# Patient Record
Sex: FEMALE | Race: WHITE | NOT HISPANIC OR LATINO | Employment: FULL TIME | URBAN - METROPOLITAN AREA
[De-identification: names, ages, dates, MRNs, and addresses within clinical notes are randomized per-mention and may not be internally consistent; named-entity substitution may affect disease eponyms.]

---

## 2018-01-05 ENCOUNTER — OFFICE VISIT (OUTPATIENT)
Dept: FAMILY MEDICINE CLINIC | Facility: CLINIC | Age: 23
End: 2018-01-05

## 2018-01-05 VITALS
HEIGHT: 63 IN | TEMPERATURE: 99.9 F | RESPIRATION RATE: 24 BRPM | BODY MASS INDEX: 38.13 KG/M2 | SYSTOLIC BLOOD PRESSURE: 102 MMHG | OXYGEN SATURATION: 98 % | DIASTOLIC BLOOD PRESSURE: 70 MMHG | WEIGHT: 215.2 LBS | HEART RATE: 136 BPM

## 2018-01-05 DIAGNOSIS — R35.0 URINARY FREQUENCY: ICD-10-CM

## 2018-01-05 DIAGNOSIS — R05.9 COUGH: ICD-10-CM

## 2018-01-05 DIAGNOSIS — J10.1 INFLUENZA A: Primary | ICD-10-CM

## 2018-01-05 DIAGNOSIS — J45.41 MODERATE PERSISTENT ASTHMA WITH ACUTE EXACERBATION: ICD-10-CM

## 2018-01-05 LAB
BILIRUB BLD-MCNC: NEGATIVE MG/DL
CLARITY, POC: ABNORMAL
COLOR UR: ABNORMAL
EXPIRATION DATE: ABNORMAL
FLUAV AG NPH QL: POSITIVE
FLUBV AG NPH QL: NEGATIVE
GLUCOSE UR STRIP-MCNC: NEGATIVE MG/DL
INTERNAL CONTROL: ABNORMAL
KETONES UR QL: NEGATIVE
LEUKOCYTE EST, POC: NEGATIVE
Lab: ABNORMAL
NITRITE UR-MCNC: NEGATIVE MG/ML
PH UR: 8 [PH] (ref 5–8)
PROT UR STRIP-MCNC: ABNORMAL MG/DL
RBC # UR STRIP: ABNORMAL /UL
SP GR UR: 1.01 (ref 1–1.03)
UROBILINOGEN UR QL: NORMAL

## 2018-01-05 PROCEDURE — 81003 URINALYSIS AUTO W/O SCOPE: CPT | Performed by: NURSE PRACTITIONER

## 2018-01-05 PROCEDURE — 87804 INFLUENZA ASSAY W/OPTIC: CPT | Performed by: NURSE PRACTITIONER

## 2018-01-05 PROCEDURE — 99204 OFFICE O/P NEW MOD 45 MIN: CPT | Performed by: NURSE PRACTITIONER

## 2018-01-05 RX ORDER — ALBUTEROL SULFATE 90 UG/1
2 AEROSOL, METERED RESPIRATORY (INHALATION) EVERY 4 HOURS PRN
Qty: 1 INHALER | Refills: 5 | Status: SHIPPED | OUTPATIENT
Start: 2018-01-05 | End: 2018-11-29 | Stop reason: SDUPTHER

## 2018-01-05 RX ORDER — FLUTICASONE PROPIONATE 50 MCG
2 SPRAY, SUSPENSION (ML) NASAL DAILY
Qty: 3 BOTTLE | Refills: 1 | Status: SHIPPED | OUTPATIENT
Start: 2018-01-05 | End: 2018-02-04

## 2018-01-05 RX ORDER — OSELTAMIVIR PHOSPHATE 75 MG/1
75 CAPSULE ORAL 2 TIMES DAILY
Qty: 10 CAPSULE | Refills: 0 | Status: SHIPPED | OUTPATIENT
Start: 2018-01-05 | End: 2018-01-16

## 2018-01-05 RX ORDER — MONTELUKAST SODIUM 10 MG/1
10 TABLET ORAL NIGHTLY
Qty: 90 TABLET | Refills: 1 | Status: SHIPPED | OUTPATIENT
Start: 2018-01-05 | End: 2018-01-16 | Stop reason: SINTOL

## 2018-01-05 RX ORDER — METHYLPREDNISOLONE 4 MG/1
TABLET ORAL
Qty: 21 EACH | Refills: 0 | Status: SHIPPED | OUTPATIENT
Start: 2018-01-05 | End: 2018-01-16

## 2018-01-05 RX ORDER — ALBUTEROL SULFATE 90 UG/1
2 AEROSOL, METERED RESPIRATORY (INHALATION) EVERY 4 HOURS PRN
COMMUNITY
End: 2018-01-05 | Stop reason: SDUPTHER

## 2018-01-05 RX ORDER — AZITHROMYCIN 250 MG/1
TABLET, FILM COATED ORAL
Qty: 6 TABLET | Refills: 0 | Status: SHIPPED | OUTPATIENT
Start: 2018-01-05 | End: 2018-01-16

## 2018-01-05 NOTE — PROGRESS NOTES
Subjective   Ely Wills is a 22 y.o. female.     Cough   This is a new problem. The current episode started in the past 7 days. The problem has been gradually worsening. The problem occurs constantly. The cough is productive of purulent sputum. Associated symptoms include chills, ear congestion, a fever, headaches, myalgias, nasal congestion, postnasal drip, rhinorrhea, a sore throat, shortness of breath, sweats and wheezing. Pertinent negatives include no chest pain, eye redness, hemoptysis or rash. The symptoms are aggravated by cold air and lying down. She has tried steroid inhaler, a beta-agonist inhaler and OTC cough suppressant for the symptoms. The treatment provided no relief. Her past medical history is significant for asthma and bronchitis.   URI    This is a new problem. The current episode started in the past 7 days. The maximum temperature recorded prior to her arrival was 100.4 - 100.9 F. The fever has been present for less than 1 day. Associated symptoms include congestion, coughing, headaches, rhinorrhea, sinus pain, sneezing, a sore throat and wheezing. Pertinent negatives include no chest pain, diarrhea, dysuria, joint pain, nausea, rash, swollen glands or vomiting. She has tried decongestant, antihistamine, inhaler use, NSAIDs and sleep for the symptoms. The treatment provided no relief.   Urinary Frequency    This is a new problem. The current episode started yesterday. The problem occurs intermittently. The problem has been unchanged. The patient is experiencing no pain. She is sexually active. There is no history of pyelonephritis. Associated symptoms include chills, frequency, sweats and urgency. Pertinent negatives include no discharge, flank pain, hematuria, nausea or vomiting. She has tried nothing for the symptoms.      NP to establish care    Flu like symptoms for the past 2 days  Cough that is productive, wheezing, HA, fever and chills, body aches, fatigue, chest tightness  + hx of  asthma not currently controlled on asthma treatment for the past several months has been coughing a lot  Needs refill on Advair  Not currently on Singulair or antihistamine daily    Urinary urgency no dysuria or back pain or abdominal pain or blood in urine    Migraine HAs  Takes OTC meds to help    The following portions of the patient's history were reviewed and updated as appropriate: allergies, current medications, past family history, past medical history, past social history, past surgical history and problem list.    Review of Systems   Constitutional: Positive for activity change, appetite change, chills and fever.   HENT: Positive for congestion, postnasal drip, rhinorrhea, sinus pain, sinus pressure, sneezing and sore throat.    Eyes: Positive for discharge (watery eyes). Negative for redness.   Respiratory: Positive for cough, chest tightness, shortness of breath and wheezing. Negative for hemoptysis.    Cardiovascular: Negative for chest pain.   Gastrointestinal: Negative for diarrhea, nausea and vomiting.   Endocrine: Negative.    Genitourinary: Positive for frequency and urgency. Negative for difficulty urinating, dysuria, flank pain and hematuria.   Musculoskeletal: Positive for myalgias. Negative for joint pain.   Skin: Negative.  Negative for rash.   Allergic/Immunologic: Negative.    Neurological: Positive for headaches.   Hematological: Negative.    Psychiatric/Behavioral: Negative.  Negative for sleep disturbance.       Objective   Physical Exam   Constitutional: She is oriented to person, place, and time. She appears well-developed and well-nourished. She is active. She has a sickly appearance. No distress.   HENT:   Head: Normocephalic.   Right Ear: Tympanic membrane, external ear and ear canal normal.   Left Ear: Tympanic membrane, external ear and ear canal normal.   Nose: Mucosal edema and rhinorrhea present. Right sinus exhibits maxillary sinus tenderness and frontal sinus tenderness. Left  sinus exhibits maxillary sinus tenderness and frontal sinus tenderness.   Mouth/Throat: Uvula is midline, oropharynx is clear and moist and mucous membranes are normal. No oropharyngeal exudate, posterior oropharyngeal edema or posterior oropharyngeal erythema.   Eyes: Conjunctivae are normal.   Neck: Normal range of motion. Neck supple.   Cardiovascular: Normal rate, regular rhythm and normal heart sounds.    Pulmonary/Chest: Effort normal and breath sounds normal. She has no wheezes.   Lymphadenopathy:     She has no cervical adenopathy.   Neurological: She is alert and oriented to person, place, and time.   Skin: Skin is warm and dry. No rash noted.   Psychiatric: She has a normal mood and affect. Her behavior is normal. Judgment and thought content normal.   Nursing note and vitals reviewed.      Assessment/Plan   Ely was seen today for establish care, cough, uri, headache and urinary frequency.    Diagnoses and all orders for this visit:    Influenza A    Moderate persistent asthma with acute exacerbation    Urinary frequency  -     POCT urinalysis dipstick, automated    Cough  -     POCT Influenza A/B    Other orders  -     oseltamivir (TAMIFLU) 75 MG capsule; Take 1 capsule by mouth 2 (Two) Times a Day.  -     fluticasone-salmeterol (ADVAIR DISKUS) 250-50 MCG/DOSE DISKUS; Inhale 1 puff 2 (Two) Times a Day.  -     albuterol (PROVENTIL HFA;VENTOLIN HFA) 108 (90 Base) MCG/ACT inhaler; Inhale 2 puffs Every 4 (Four) Hours As Needed for Wheezing.  -     fluticasone (FLONASE) 50 MCG/ACT nasal spray; 2 sprays into each nostril Daily for 30 days. Administer 2 sprays in each nostril for each dose.  -     montelukast (SINGULAIR) 10 MG tablet; Take 1 tablet by mouth Every Night.  -     MethylPREDNISolone (MEDROL, CONNOR,) 4 MG tablet; Take as directed on package instructions.  -     azithromycin (ZITHROMAX) 250 MG tablet; Take 2 tablets the first day, then 1 tablet daily for 4 days.    Urine negative for bacteria  Pt to  drink plenty of fluids    Influenza A positive, B negative pt informed  Take meds as directed  Since pt reports asthma as not currently controlled will start pt on Flonase NS, OTC Claritin or Allegra and Singulair at bedtime. Will refill Advair to use twice day  Will have pt take Tamiflu as directed; will have pt also take Zpak and steroids  Continue OTC meds for symptom relief. Rest and fluids. Avoid contact with others for approx 5 days  F/U if not improving or go to ER if difficulty breathing

## 2018-01-12 ENCOUNTER — TELEPHONE (OUTPATIENT)
Dept: FAMILY MEDICINE CLINIC | Facility: CLINIC | Age: 23
End: 2018-01-12

## 2018-01-16 ENCOUNTER — OFFICE VISIT (OUTPATIENT)
Dept: FAMILY MEDICINE CLINIC | Facility: CLINIC | Age: 23
End: 2018-01-16

## 2018-01-16 VITALS
SYSTOLIC BLOOD PRESSURE: 128 MMHG | DIASTOLIC BLOOD PRESSURE: 90 MMHG | TEMPERATURE: 97.4 F | WEIGHT: 215.8 LBS | HEIGHT: 63 IN | HEART RATE: 88 BPM | RESPIRATION RATE: 20 BRPM | BODY MASS INDEX: 38.24 KG/M2 | OXYGEN SATURATION: 98 %

## 2018-01-16 DIAGNOSIS — J45.40 MODERATE PERSISTENT ASTHMA WITHOUT COMPLICATION: Primary | ICD-10-CM

## 2018-01-16 DIAGNOSIS — J06.9 PROTRACTED URI: ICD-10-CM

## 2018-01-16 PROCEDURE — 99214 OFFICE O/P EST MOD 30 MIN: CPT | Performed by: NURSE PRACTITIONER

## 2018-01-16 RX ORDER — PREDNISONE 10 MG/1
10 TABLET ORAL 2 TIMES DAILY
Qty: 10 TABLET | Refills: 0 | Status: SHIPPED | OUTPATIENT
Start: 2018-01-16 | End: 2018-11-29

## 2018-01-16 NOTE — PROGRESS NOTES
Subjective   Ely Wills is a 22 y.o. female.     History of Present Illness   URI persistent cough  Cough and rattling in her chest at times, wheezing  Feeling much better after treatment for Flu A. Taook Tamiflu Zpak and Medrol dose pack  Using Advair and ProAir inhaler  Taking Mucinex and Daquil OTC  No fever or chills, no SOA or JARAMILLO  No hx of pneumonia   + hx of asthma worsening     The following portions of the patient's history were reviewed and updated as appropriate: allergies, current medications, past family history, past medical history, past social history, past surgical history and problem list.    Review of Systems   Constitutional: Negative for chills and fever.   HENT: Negative.  Negative for congestion, ear pain, rhinorrhea, sinus pain and sore throat.    Eyes: Negative.    Respiratory: Positive for cough and wheezing.    Cardiovascular: Negative.    Gastrointestinal: Negative.    Endocrine: Negative.    Musculoskeletal: Negative.    Skin: Negative.    Allergic/Immunologic: Negative.    Neurological: Negative.  Negative for dizziness and headaches.   Hematological: Negative.    Psychiatric/Behavioral: Negative for sleep disturbance.       Objective   Physical Exam   Constitutional: She is oriented to person, place, and time. She appears well-developed and well-nourished.   HENT:   Head: Normocephalic.   Right Ear: External ear normal.   Left Ear: External ear normal.   Nose: Nose normal.   Mouth/Throat: Oropharynx is clear and moist.   Neck: Neck supple.   Cardiovascular: Normal rate, regular rhythm and normal heart sounds.    Pulmonary/Chest: Effort normal and breath sounds normal. No respiratory distress. She has no wheezes. She has no rales.   Abdominal: Soft.   Neurological: She is alert and oriented to person, place, and time.   Skin: Skin is warm and dry.   Psychiatric: She has a normal mood and affect. Her behavior is normal.   Nursing note and vitals reviewed.      Assessment/Plan    Ely was seen today for cough and asthma.    Diagnoses and all orders for this visit:    Moderate persistent asthma without complication    Protracted URI    Other orders  -     predniSONE (DELTASONE) 10 MG tablet; Take 1 tablet by mouth 2 (Two) Times a Day.      Continue inhalers and Mucinex with plenty of fluids  Will treat with a few more days of oral prednisone. F/U if not improving. Pt agrees.

## 2018-01-31 ENCOUNTER — TELEPHONE (OUTPATIENT)
Dept: FAMILY MEDICINE CLINIC | Facility: CLINIC | Age: 23
End: 2018-01-31

## 2018-01-31 NOTE — TELEPHONE ENCOUNTER
----- Message from Robert Peterson sent at 1/31/2018  2:29 PM EST -----  Contact: ROSALES / PT CALL  PT CALLED AND WAS GIVEN A SAMPLE OF BREO INHALER 200MG.  PATIENT WOULD NOW LIKE TO HAVE AN RX FOR IT. PLEASE CALL PT IF CALLED IN     [facility]      PT CALL BACK [phone]

## 2018-06-18 ENCOUNTER — TELEPHONE (OUTPATIENT)
Dept: FAMILY MEDICINE CLINIC | Facility: CLINIC | Age: 23
End: 2018-06-18

## 2018-06-18 NOTE — TELEPHONE ENCOUNTER
----- Message from Amy Lara sent at 6/18/2018  9:44 AM EDT -----  Contact: BILL; MED REFILLS   PT CALLED IN REQUESTING REFILLS ON THE BREO. SHE WAS ABLE TO  THE MEDICATIONS.  USES IT VERY FREQUENTLY.  SHE WOULD JUST LIKE TO BE ABLE TO HAVE REFILLS AT THE PHARMACY WAITING FOR HER IF POSSIBLE. IF AN APPOINTMENT IS NEEDED PLEASE LET HER KNOW.   SHE HAS ENOUGH TO LAST THIS MONTH SHE JUST WANTING REFILLS.   CALL BACK   [phone]    PT AWARE BILL IS NOT IF THE OFFICE THIS WEEK.

## 2018-07-05 ENCOUNTER — TELEPHONE (OUTPATIENT)
Dept: FAMILY MEDICINE CLINIC | Facility: CLINIC | Age: 23
End: 2018-07-05

## 2018-07-05 NOTE — TELEPHONE ENCOUNTER
----- Message from Daisha Hernandez sent at 7/5/2018  8:55 AM EDT -----  Contact: BILL  PATIENT CALLED ABOUT HER BREO REFILLS, SHE WANTED TO KNOW IF CAN PLEASE CALL THE PHARMACY AND PUT MORE REFILLS ON THAT RX.   THIS IS SOMETHING SHE USES ALL THE TIME THAT REALLY HELPS HER ASTHMA     [phone]  [facility]

## 2018-07-05 NOTE — TELEPHONE ENCOUNTER
I sent it in with 1 refill already. Did she not get it? She will have to be seen every 6 months or sooner if her asthma is not controlled that is why I did not give more than 1 refill. [email or web address]

## 2018-11-29 ENCOUNTER — OFFICE VISIT (OUTPATIENT)
Dept: FAMILY MEDICINE CLINIC | Facility: CLINIC | Age: 23
End: 2018-11-29

## 2018-11-29 VITALS
DIASTOLIC BLOOD PRESSURE: 90 MMHG | RESPIRATION RATE: 16 BRPM | HEIGHT: 63 IN | TEMPERATURE: 98.7 F | WEIGHT: 218.4 LBS | SYSTOLIC BLOOD PRESSURE: 120 MMHG | BODY MASS INDEX: 38.7 KG/M2 | HEART RATE: 92 BPM

## 2018-11-29 DIAGNOSIS — F32.A ANXIETY AND DEPRESSION: ICD-10-CM

## 2018-11-29 DIAGNOSIS — F41.9 ANXIETY AND DEPRESSION: ICD-10-CM

## 2018-11-29 DIAGNOSIS — J45.41 MODERATE PERSISTENT ASTHMA WITH EXACERBATION: Primary | ICD-10-CM

## 2018-11-29 PROCEDURE — 99214 OFFICE O/P EST MOD 30 MIN: CPT | Performed by: NURSE PRACTITIONER

## 2018-11-29 RX ORDER — ALBUTEROL SULFATE 90 UG/1
2 AEROSOL, METERED RESPIRATORY (INHALATION) EVERY 4 HOURS PRN
Qty: 1 INHALER | Refills: 5 | Status: SHIPPED | OUTPATIENT
Start: 2018-11-29 | End: 2019-03-26 | Stop reason: SDUPTHER

## 2018-11-29 RX ORDER — PREDNISONE 10 MG/1
TABLET ORAL
Qty: 20 TABLET | Refills: 0 | Status: SHIPPED | OUTPATIENT
Start: 2018-11-29 | End: 2019-03-26

## 2018-11-29 NOTE — PROGRESS NOTES
Subjective   Ely Wills is a 22 y.o. female.     History of Present Illness   Asthma uncontrolled, having wheezing and tightness in chest and chronic cough  Using Advair and Flonase, using rescue inhaler daily for the past month and before that every 3 days  No longer on Breo (ran out) which helped a lot, Advair only once a day   No longer on allergy medication like Claritin  No fever or chills, no runny nose or nasal congestion    Having issues with anxiety and depression (usually situational if things get really stressful)  Was treated in  and college and has seen a counselor in the past, not currently  Did not like how medication made her feel (very flat) does not want to take medication at this time  Denies SI/Hi no hx of self harm or abuse    The following portions of the patient's history were reviewed and updated as appropriate: allergies, current medications, past family history, past medical history, past social history, past surgical history and problem list.    Review of Systems   Constitutional: Negative for activity change, chills, fatigue, fever, unexpected weight gain and unexpected weight loss.   HENT: Negative for congestion, ear pain, postnasal drip, rhinorrhea, sinus pressure, sneezing, sore throat and tinnitus.    Eyes: Negative.    Respiratory: Positive for cough, chest tightness, shortness of breath and wheezing.    Cardiovascular: Negative for chest pain, palpitations and leg swelling.   Gastrointestinal: Negative.    Endocrine: Negative.  Negative for cold intolerance, heat intolerance, polydipsia, polyphagia and polyuria.   Genitourinary: Negative.    Musculoskeletal: Negative for myalgias, neck pain and neck stiffness.   Allergic/Immunologic: Positive for environmental allergies. Negative for food allergies.   Neurological: Negative for dizziness, light-headedness, headache and confusion.   Psychiatric/Behavioral: Positive for depressed mood and stress. Negative for self-injury, sleep  disturbance and suicidal ideas. The patient is nervous/anxious.        Objective   Physical Exam   Constitutional: She is oriented to person, place, and time. She appears well-developed and well-nourished. No distress.   HENT:   Head: Normocephalic.   Right Ear: External ear normal.   Left Ear: External ear normal.   Nose: Nose normal.   Mouth/Throat: Oropharynx is clear and moist.   Neck: Normal range of motion. Neck supple. No JVD present. No thyromegaly present.   Cardiovascular: Normal rate, regular rhythm and normal heart sounds. Exam reveals no gallop and no friction rub.   No murmur heard.  Pulmonary/Chest: Effort normal and breath sounds normal. No stridor. No respiratory distress. She has no wheezes. She has no rales.   Abdominal: Soft. Bowel sounds are normal.   Musculoskeletal: Normal range of motion. She exhibits no edema.   Lymphadenopathy:     She has no cervical adenopathy.   Neurological: She is alert and oriented to person, place, and time. She has normal reflexes.   Skin: Skin is warm and dry. She is not diaphoretic.   Psychiatric: Her speech is normal and behavior is normal. Judgment and thought content normal. Her mood appears anxious. Cognition and memory are normal. She exhibits a depressed mood (tearful).   Nursing note and vitals reviewed.        Assessment/Plan   Ely was seen today for asthma, anxiety and depression.    Diagnoses and all orders for this visit:    Moderate persistent asthma with exacerbation  -     Fluticasone Furoate-Vilanterol (BREO ELLIPTA) 100-25 MCG/INH aerosol powder ; Inhale 1 puff Daily.  -     albuterol (PROVENTIL HFA;VENTOLIN HFA;PROAIR HFA) 108 (90 Base) MCG/ACT inhaler; Inhale 2 puffs Every 4 (Four) Hours As Needed for Wheezing.  -     predniSONE (DELTASONE) 10 MG tablet; Take 4 tabs X2 days, 3 tabs X2 days, 2 tabs X 2 days, 1 tab X 2 days    Anxiety and depression  -     Pharmacogenetic Testing (PGT) - Saliva, Oral Cavity      Will check pharmacogenetic  testing for proper medication since pt does not want to start anything, she was given a list of Mental Health Providers in the area and will make an appt  Will start pt on Prednisone and refill Albuterol and Flonase and Breo inhalers.   F/U if breathing no improving or cough persists will need to do PFTs and increase asthma treatment. Pt agrees.

## 2018-12-03 ENCOUNTER — TELEPHONE (OUTPATIENT)
Dept: FAMILY MEDICINE CLINIC | Facility: CLINIC | Age: 23
End: 2018-12-03

## 2018-12-03 NOTE — TELEPHONE ENCOUNTER
----- Message from Amy Lara sent at 12/3/2018 12:45 PM EST -----  Contact: CONNIE; ORDER FORM NEEDS UPDATING   [facility] CALLED AND WANTED TO LET YOU KNOW THAT THE TEST PROFILE OPTION IS MISSING. IF YOU CAN SELECT THAT AND RESEND IT THEY WILL BE ABLE TO COMPLETE THE TEST YOU ARE REQUESTING.     CALL BACK   [phone]

## 2018-12-05 ENCOUNTER — TELEPHONE (OUTPATIENT)
Dept: FAMILY MEDICINE CLINIC | Facility: CLINIC | Age: 23
End: 2018-12-05

## 2018-12-05 NOTE — TELEPHONE ENCOUNTER
----- Message from Amy Lara sent at 12/5/2018  9:27 AM EST -----  Contact: qi; order needed filled out   Tia vogel is still waiting for the req form to be filled out.         Call back   [phone]

## 2018-12-07 ENCOUNTER — TELEPHONE (OUTPATIENT)
Dept: FAMILY MEDICINE CLINIC | Facility: CLINIC | Age: 23
End: 2018-12-07

## 2018-12-07 DIAGNOSIS — J45.909 MODERATE ASTHMA WITHOUT COMPLICATION, UNSPECIFIED WHETHER PERSISTENT: Primary | ICD-10-CM

## 2018-12-07 NOTE — TELEPHONE ENCOUNTER
----- Message from Amy Lara sent at 12/7/2018  2:23 PM EST -----  Contact: qi nevarez refill   Pt called in wanting to know if breo could be changed to advair. She stated that breo was over 200every month.       Call back   [phone]

## 2018-12-14 ENCOUNTER — TELEPHONE (OUTPATIENT)
Dept: FAMILY MEDICINE CLINIC | Facility: CLINIC | Age: 23
End: 2018-12-14

## 2018-12-14 NOTE — TELEPHONE ENCOUNTER
Her biggest concern is painful cough, wheezing & SOA. She has also had PND, sinus pressure, congestion & HA. Denies fever. She finished the Zpak she was given and is still using the Breo inhaler.

## 2018-12-14 NOTE — TELEPHONE ENCOUNTER
Sounds like pt needs to be seen again. Would recommend that she go to [facility] or ER for evaluation since it was 2 weeks ago since I evaluated her she may need a CXR or breathing treatment or IV steroids.[facility]

## 2018-12-14 NOTE — TELEPHONE ENCOUNTER
----- Message from Robert Raymond sent at 12/14/2018 12:47 PM EST -----  Contact: pt; guru  Patient thinks she has a cold and is wanting to know can she get something called in for that.    [place], [place]    Phone: [phone]

## 2018-12-14 NOTE — TELEPHONE ENCOUNTER
Please ask Ely: Does she want something for cough or congestion? Or what are her symptoms? How many days has she been feeling ill? What has she already tried to feel better? Fever or chills? ajc

## 2018-12-17 ENCOUNTER — TELEPHONE (OUTPATIENT)
Dept: FAMILY MEDICINE CLINIC | Facility: CLINIC | Age: 23
End: 2018-12-17

## 2018-12-17 NOTE — TELEPHONE ENCOUNTER
----- Message from Amy Lara sent at 12/17/2018  3:41 PM EST -----  Contact: BILL' MED QUESTION  Pt called in stating that breo and adviar are both over 200. She stated that the pharmacy would be sending request over for medication. The pt wanted to know if she is able to use her rescue inhaler every day.       Call abck     [phone]

## 2018-12-18 RX ORDER — BUDESONIDE AND FORMOTEROL FUMARATE DIHYDRATE 160; 4.5 UG/1; UG/1
2 AEROSOL RESPIRATORY (INHALATION)
Qty: 1 INHALER | Refills: 5 | Status: SHIPPED | OUTPATIENT
Start: 2018-12-18 | End: 2019-04-25 | Stop reason: SDUPTHER

## 2018-12-18 NOTE — TELEPHONE ENCOUNTER
Yes you can use your rescue inhaler but it sounds like you need an inhaled steroid along with current treatment or long acting beta agonist which is what Advair or Breo. There should be one that your insurance covers it may be Symbicort. Will send that into the pharmacy to see if this is less expensive.

## 2019-03-26 ENCOUNTER — OFFICE VISIT (OUTPATIENT)
Dept: FAMILY MEDICINE CLINIC | Facility: CLINIC | Age: 24
End: 2019-03-26

## 2019-03-26 VITALS
TEMPERATURE: 99 F | DIASTOLIC BLOOD PRESSURE: 84 MMHG | SYSTOLIC BLOOD PRESSURE: 126 MMHG | BODY MASS INDEX: 39.09 KG/M2 | WEIGHT: 220.6 LBS

## 2019-03-26 DIAGNOSIS — J45.41 MODERATE PERSISTENT ASTHMA WITH EXACERBATION: ICD-10-CM

## 2019-03-26 DIAGNOSIS — M54.2 NECK PAIN: ICD-10-CM

## 2019-03-26 DIAGNOSIS — H81.10 BENIGN PAROXYSMAL VERTIGO, UNSPECIFIED LATERALITY: Primary | ICD-10-CM

## 2019-03-26 PROCEDURE — 99214 OFFICE O/P EST MOD 30 MIN: CPT | Performed by: NURSE PRACTITIONER

## 2019-03-26 RX ORDER — MECLIZINE HYDROCHLORIDE 25 MG/1
25 TABLET ORAL 3 TIMES DAILY PRN
Qty: 30 TABLET | Refills: 0 | Status: SHIPPED | OUTPATIENT
Start: 2019-03-26 | End: 2020-04-21

## 2019-03-26 RX ORDER — ALBUTEROL SULFATE 90 UG/1
2 AEROSOL, METERED RESPIRATORY (INHALATION) EVERY 4 HOURS PRN
Qty: 1 INHALER | Refills: 5 | Status: SHIPPED | OUTPATIENT
Start: 2019-03-26 | End: 2019-12-17 | Stop reason: SDUPTHER

## 2019-03-26 NOTE — PATIENT INSTRUCTIONS
Benign Positional Vertigo  Vertigo is the feeling that you or your surroundings are moving when they are not. Benign positional vertigo is the most common form of vertigo. The cause of this condition is not serious (is benign). This condition is triggered by certain movements and positions (is positional). This condition can be dangerous if it occurs while you are doing something that could endanger you or others, such as driving.  What are the causes?  In many cases, the cause of this condition is not known. It may be caused by a disturbance in an area of the inner ear that helps your brain to sense movement and balance. This disturbance can be caused by a viral infection (labyrinthitis), head injury, or repetitive motion.  What increases the risk?  This condition is more likely to develop in:  · Women.  · People who are 50 years of age or older.    What are the signs or symptoms?  Symptoms of this condition usually happen when you move your head or your eyes in different directions. Symptoms may start suddenly, and they usually last for less than a minute. Symptoms may include:  · Loss of balance and falling.  · Feeling like you are spinning or moving.  · Feeling like your surroundings are spinning or moving.  · Nausea and vomiting.  · Blurred vision.  · Dizziness.  · Involuntary eye movement (nystagmus).    Symptoms can be mild and cause only slight annoyance, or they can be severe and interfere with daily life. Episodes of benign positional vertigo may return (recur) over time, and they may be triggered by certain movements. Symptoms may improve over time.  How is this diagnosed?  This condition is usually diagnosed by medical history and a physical exam of the head, neck, and ears. You may be referred to a health care provider who specializes in ear, nose, and throat (ENT) problems (otolaryngologist) or a provider who specializes in disorders of the nervous system (neurologist). You may have additional testing,  including:  · MRI.  · A CT scan.  · Eye movement tests. Your health care provider may ask you to change positions quickly while he or she watches you for symptoms of benign positional vertigo, such as nystagmus. Eye movement may be tested with an electronystagmogram (ENG), caloric stimulation, the [place]-Hallpike test, or the roll test.  · An electroencephalogram (EEG). This records electrical activity in your brain.  · Hearing tests.    How is this treated?  Usually, your health care provider will treat this by moving your head in specific positions to adjust your inner ear back to normal. Surgery may be needed in severe cases, but this is rare. In some cases, benign positional vertigo may resolve on its own in 2-4 weeks.  Follow these instructions at home:  Safety  · Move slowly.Avoid sudden body or head movements.  · Avoid driving.  · Avoid operating heavy machinery.  · Avoid doing any tasks that would be dangerous to you or others if a vertigo episode would occur.  · If you have trouble walking or keeping your balance, try using a cane for stability. If you feel dizzy or unstable, sit down right away.  · Return to your normal activities as told by your health care provider. Ask your health care provider what activities are safe for you.  General instructions  · Take over-the-counter and prescription medicines only as told by your health care provider.  · Avoid certain positions or movements as told by your health care provider.  · Drink enough fluid to keep your urine clear or pale yellow.  · Keep all follow-up visits as told by your health care provider. This is important.  Contact a health care provider if:  · You have a fever.  · Your condition gets worse or you develop new symptoms.  · Your family or friends notice any behavioral changes.  · Your nausea or vomiting gets worse.  · You have numbness or a “pins and needles” sensation.  Get help right away if:  · You have difficulty speaking or moving.  · You are  always dizzy.  · You faint.  · You develop severe headaches.  · You have weakness in your legs or arms.  · You have changes in your hearing or vision.  · You develop a stiff neck.  · You develop sensitivity to light.  This information is not intended to replace advice given to you by your health care provider. Make sure you discuss any questions you have with your health care provider.  Document Released: 09/25/2007 Document Revised: 05/25/2017 Document Reviewed: 04/11/2016  [employer] Interactive Patient Education © 2019 [employer]

## 2019-03-26 NOTE — PROGRESS NOTES
Subjective   Ely Acevedo is a 23 y.o. female.     History of Present Illness   C/O Dizziness for the past month  Happens when she is sitting still, bending over and raising up makes it worse and working, walking  The room is not spinning around her but she feels unsteady on her feet and has run into walls   Has tried OTC allergy pills and that has not helped    Asthma  Feels SOB uses rescue inhaler 2-3 times weekly, occasional cough  Using Symbicort 2 times day.   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    Review of Systems   Constitutional: Positive for fatigue. Negative for chills and fever.   HENT: Positive for ear pain (ear pressure). Negative for nosebleeds.    Respiratory: Positive for cough. Negative for shortness of breath.    Genitourinary: Negative for dysuria.   Neurological: Positive for dizziness, light-headedness and headache (random around eyes). Negative for seizures, syncope, weakness and memory problem.   Psychiatric/Behavioral: Negative for hallucinations and sleep disturbance.       Objective   Physical Exam   Constitutional: She is oriented to person, place, and time. Vital signs are normal. She appears well-developed and well-nourished. No distress.   HENT:   Head: Normocephalic.   Right  Ear: Hearing, tympanic membrane, external ear and ear canal normal.   Left Ear: Hearing, tympanic membrane, external ear and ear canal normal.   Nose: Nose normal.   Mouth/Throat: Uvula is midline, oropharynx is clear and moist and mucous membranes are normal.   Eyes: EOM and lids are normal. Pupils are equal, round, and reactive to light.   Neck: Normal range of motion. Neck supple. No thyromegaly present.   Cardiovascular: Normal rate, regular rhythm, S1 normal, S2 normal, normal heart sounds and intact distal pulses. Exam reveals no gallop and no friction rub.   No murmur heard.  Pulmonary/Chest: Effort normal and breath sounds normal. No respiratory distress. She has no wheezes. She has no rales.   Abdominal: Soft. Normal appearance and bowel sounds are normal.   Musculoskeletal: Normal range of motion. She exhibits no edema.   Lymphadenopathy:     She has no cervical adenopathy.   Neurological: She is alert and oriented to person, place, and time. She has normal strength and normal reflexes. Coordination and gait normal.   Reflex Scores:       Patellar reflexes are 2+ on the right side and 2+ on the left side.  Positive [place] Hallpike maneuver   Skin: Skin is warm and dry.   Psychiatric: Her speech is normal. Judgment and thought content normal. She exhibits a depressed mood (crying during visit).   Nursing note and vitals reviewed.        Assessment/Plan   Ely was seen today for dizziness, fatigue and leg pain.    Diagnoses and all orders for this visit:    Benign paroxysmal vertigo, unspecified laterality  -     meclizine (ANTIVERT) 25 MG tablet; Take 1 tablet by mouth 3 (Three) Times a Day As Needed for dizziness.    Moderate persistent asthma with exacerbation  -     albuterol sulfate  (90 Base) MCG/ACT inhaler; Inhale 2 puffs Every 4 (Four) Hours As Needed for Wheezing.    Neck pain      Will have pt take some Meclizine for Vertigo if this persists will refer to ENT for evaluation, continue  allergy medications  Home Epley Maneuvers can help  Use inhalers as needed,   may need to stop Metformin to see if this will cause her symptoms to resolve. Pt will call back if symptoms persist

## 2019-03-29 ENCOUNTER — TELEPHONE (OUTPATIENT)
Dept: FAMILY MEDICINE CLINIC | Facility: CLINIC | Age: 24
End: 2019-03-29

## 2019-03-29 DIAGNOSIS — F41.9 ANXIETY: Primary | ICD-10-CM

## 2019-03-29 NOTE — TELEPHONE ENCOUNTER
Per pt she was on an anxiety med in college but she did not remember the name. She did not like the side effects so she stopped within a few months. Her mother has depression. She had the swap done in our office to see which meds she would tolerate better.

## 2019-03-29 NOTE — TELEPHONE ENCOUNTER
Has pt ever taken any medication for anxiety or depression? Does she have a family hx of anxiety or depression? [email or web address]

## 2019-03-29 NOTE — TELEPHONE ENCOUNTER
----- Message from Robert Morales sent at 3/29/2019  9:33 AM EDT -----  Contact: BILL ROSALES/LINDA REQUEST  Pt called stating she seen her counselor yesterday and her counselor states that she should be put on antidepressants. Please give pt a call at [phone]

## 2019-04-24 ENCOUNTER — OFFICE VISIT (OUTPATIENT)
Dept: FAMILY MEDICINE CLINIC | Facility: CLINIC | Age: 24
End: 2019-04-24

## 2019-04-24 VITALS
DIASTOLIC BLOOD PRESSURE: 80 MMHG | RESPIRATION RATE: 16 BRPM | BODY MASS INDEX: 38.71 KG/M2 | HEART RATE: 84 BPM | WEIGHT: 218.5 LBS | TEMPERATURE: 98.7 F | SYSTOLIC BLOOD PRESSURE: 130 MMHG | HEIGHT: 63 IN

## 2019-04-24 DIAGNOSIS — R42 DIZZINESS: Primary | ICD-10-CM

## 2019-04-24 DIAGNOSIS — J30.2 SEASONAL ALLERGIES: ICD-10-CM

## 2019-04-24 DIAGNOSIS — H69.81 ETD (EUSTACHIAN TUBE DYSFUNCTION), RIGHT: ICD-10-CM

## 2019-04-24 DIAGNOSIS — F41.9 ANXIETY: ICD-10-CM

## 2019-04-24 DIAGNOSIS — R25.1 TREMOR: ICD-10-CM

## 2019-04-24 PROCEDURE — 99214 OFFICE O/P EST MOD 30 MIN: CPT | Performed by: NURSE PRACTITIONER

## 2019-04-24 RX ORDER — FLUTICASONE PROPIONATE 50 MCG
2 SPRAY, SUSPENSION (ML) NASAL DAILY
Qty: 1 BOTTLE | Refills: 3 | Status: SHIPPED | OUTPATIENT
Start: 2019-04-24 | End: 2019-05-24

## 2019-04-24 RX ORDER — LEVOCETIRIZINE DIHYDROCHLORIDE 5 MG/1
5 TABLET, FILM COATED ORAL EVERY EVENING
Qty: 30 TABLET | Refills: 3 | Status: SHIPPED | OUTPATIENT
Start: 2019-04-24 | End: 2021-04-07

## 2019-04-24 NOTE — PROGRESS NOTES
Subjective   Ely Acevedo is a 23 y.o. female.     History of Present Illness   Tremor   Right hand shaking comes and goes   Noticed for the past month  Worse with writing, putting on makeup, doing typing fine motor skill is noticed most  Majored in art, used to have good steady hand so this is upsetting her  Dizziness  Still having dizziness, less than before, at least one time day, not has bad as it was, cannot associate it with any particular movement  HA behind right eye for the past couple weeks pressure sensation, like the beginning of a migraine, taking Ibuprofen can help at times  Some allergy sx such as runny nose, itchy eyes, no change hearing or fullness or itchy ears, had some fluid in ear last office visit. Not taking any OTC allergy meds at this time. Using her inhaler which has helped with cough  No blurred vision  No weakness of  or dropping things  Anxiety  Just started on Sertraline right before sx started, mood is better on this medication  Worried about taking anything different   Took Meclizine for dizziness which did seem to help  No room spinning or walking into walls or falling or passing out  Still not taking Metformin    The following portions of the patient's history were reviewed and updated as appropriate: allergies, current medications, past family history, past medical history, past social history, past surgical history and problem list.    Review of Systems   Constitutional: Positive for activity change. Negative for appetite change, chills, diaphoresis, fatigue, fever, unexpected weight gain and unexpected weight loss.   HENT: Positive for congestion, postnasal drip, rhinorrhea and sinus pressure. Negative for ear discharge, ear pain, hearing loss, sore throat, tinnitus and trouble swallowing.    Eyes: Negative for blurred vision, double vision and visual disturbance.   Respiratory: Negative for cough, chest tightness, shortness of breath and wheezing.    Cardiovascular:  Negative.    Gastrointestinal: Negative.  Negative for nausea and vomiting.   Musculoskeletal: Negative.  Negative for myalgias, neck pain and neck stiffness.   Skin: Negative.    Allergic/Immunologic: Positive for environmental allergies.   Neurological: Positive for tremors, light-headedness and headache. Negative for weakness and numbness.   Hematological: Negative.    Psychiatric/Behavioral: Positive for depressed mood. The patient is nervous/anxious.        Objective   Physical Exam   Constitutional: She is oriented to person, place, and time. She appears well-developed and well-nourished. No distress.   HENT:   Head: Normocephalic and atraumatic.   Right Ear: External ear normal. No drainage, swelling or tenderness. Tympanic membrane is retracted. Tympanic membrane is not erythematous and not bulging. A middle ear effusion is present. No decreased hearing is noted.   Left Ear: External ear normal. No drainage, swelling or tenderness. Tympanic membrane is not erythematous, not retracted and not bulging. No decreased hearing is noted.   Nose: Nose normal.   Mouth/Throat: Oropharynx is clear and moist. No oropharyngeal exudate.   Eyes: Pupils are equal, round, and reactive to light.   Neck: Normal range of motion. Neck supple. No JVD present. No thyromegaly present.   Cardiovascular: Normal rate, regular rhythm, S1 normal, S2 normal, normal heart sounds and intact distal pulses. Exam reveals no gallop and no friction rub.   No murmur heard.  Pulmonary/Chest: Effort normal and breath sounds normal. No stridor. No respiratory distress. She has no decreased breath sounds. She has no wheezes. She has no rhonchi. She has no rales.   Abdominal: Soft. Bowel sounds are normal.   Musculoskeletal: Normal range of motion. She exhibits no edema.   Lymphadenopathy:     She has no cervical adenopathy.   Neurological: She is alert and oriented to person, place, and time. She has normal strength and normal reflexes. She displays  normal reflexes. No cranial nerve deficit or sensory deficit. She exhibits normal muscle tone. Coordination and gait normal.   Reflex Scores:       Bicep reflexes are 2+ on the right side and 2+ on the left side.       Patellar reflexes are 2+ on the right side and 2+ on the left side.  Skin: Skin is warm, dry and intact. Capillary refill takes less than 2 seconds. Turgor is normal. No rash noted. No cyanosis.   Psychiatric: Her speech is normal and behavior is normal. Judgment and thought content normal. Her mood appears anxious. Cognition and memory are normal.   Nursing note and vitals reviewed.        Assessment/Plan   Ely was seen today for right hand tremor and fu on depression.    Diagnoses and all orders for this visit:    Dizziness    Tremor    ETD (Eustachian tube dysfunction), right  -     fluticasone (FLONASE) 50 MCG/ACT nasal spray; 2 sprays into the nostril(s) as directed by provider Daily for 30 days. Administer 2 sprays in each nostril for each dose.  -     levocetirizine (XYZAL) 5 MG tablet; Take 1 tablet by mouth Every Evening.    Seasonal allergies  -     levocetirizine (XYZAL) 5 MG tablet; Take 1 tablet by mouth Every Evening.    Anxiety  -     sertraline (ZOLOFT) 50 MG tablet; Take 1 tablet by mouth Daily.    I suspect her tremor is related to Sertraline which she just started a week before she noticed the sx and it is a common side effect of this medication. Reassurance given as neuro exam is completely normal and no tremor noted on exam today  Will start pt on allergy meds to see if dizziness resolves  Will see pt back in 3 weeks if continues  Will refill meds  Pt agrees.

## 2019-04-26 RX ORDER — BUDESONIDE AND FORMOTEROL FUMARATE DIHYDRATE 160; 4.5 UG/1; UG/1
AEROSOL RESPIRATORY (INHALATION)
Qty: 10.2 INHALER | Refills: 1 | Status: SHIPPED | OUTPATIENT
Start: 2019-04-26 | End: 2019-12-17 | Stop reason: SDUPTHER

## 2019-07-16 DIAGNOSIS — F41.9 ANXIETY: ICD-10-CM

## 2019-08-20 ENCOUNTER — TELEPHONE (OUTPATIENT)
Dept: FAMILY MEDICINE CLINIC | Facility: CLINIC | Age: 24
End: 2019-08-20

## 2019-08-20 RX ORDER — AMITRIPTYLINE HYDROCHLORIDE 25 MG/1
25 TABLET, FILM COATED ORAL NIGHTLY
Qty: 90 TABLET | Refills: 0 | Status: SHIPPED | OUTPATIENT
Start: 2019-08-20 | End: 2019-08-21

## 2019-08-20 NOTE — TELEPHONE ENCOUNTER
----- Message from Robert aRymond sent at 8/20/2019  1:40 PM EDT -----  Contact: PT; ROSALES  PATIENT THINKS SHE IS HAVING SIDE EFFECTS FROM THE sertraline (ZOLOFT) 50 MG tablet    SHE IS NOT SURE IF SHE NEEDS TO CHANGE MEDICATIONS.    PT: [phone]

## 2019-08-20 NOTE — TELEPHONE ENCOUNTER
Stop Sertraline and start Amitriptylline 25 mg at bedtime. [facility]. May need to increase dose after a few weeks, please call back if not helping. olayinka

## 2019-08-21 ENCOUNTER — TELEPHONE (OUTPATIENT)
Dept: FAMILY MEDICINE CLINIC | Facility: CLINIC | Age: 24
End: 2019-08-21

## 2019-08-21 DIAGNOSIS — F41.9 ANXIETY: Primary | ICD-10-CM

## 2019-08-21 DIAGNOSIS — F41.9 ANXIETY AND DEPRESSION: ICD-10-CM

## 2019-08-21 DIAGNOSIS — F32.A ANXIETY AND DEPRESSION: ICD-10-CM

## 2019-08-21 RX ORDER — ESCITALOPRAM OXALATE 10 MG/1
10 TABLET ORAL DAILY
Qty: 90 TABLET | Refills: 0 | Status: SHIPPED | OUTPATIENT
Start: 2019-08-21 | End: 2019-09-18 | Stop reason: SDUPTHER

## 2019-08-21 NOTE — TELEPHONE ENCOUNTER
----- Message from Robert Raymond Rep sent at 8/21/2019  8:53 AM EDT -----  Contact: PT; CONNIE  PT IS WANTING TO KNOW CAN BILL PRESCRIBE HER SOMETHING BESIDES amitriptyline (ELAVIL) 25 MG tablet BECAUSE THIS IS RELATED TO WEIGHT GAIN.  SHE WANTS TO KNOW CAN BILL SWITCH HER OVER TO LEXAPRO.    [facility]    PT: [phone]

## 2019-09-18 ENCOUNTER — TELEPHONE (OUTPATIENT)
Dept: FAMILY MEDICINE CLINIC | Facility: CLINIC | Age: 24
End: 2019-09-18

## 2019-09-18 DIAGNOSIS — F41.9 ANXIETY AND DEPRESSION: ICD-10-CM

## 2019-09-18 DIAGNOSIS — F32.A ANXIETY AND DEPRESSION: ICD-10-CM

## 2019-09-18 RX ORDER — ESCITALOPRAM OXALATE 20 MG/1
20 TABLET ORAL DAILY
Qty: 90 TABLET | Refills: 1 | Status: SHIPPED | OUTPATIENT
Start: 2019-09-18 | End: 2019-11-13

## 2019-09-18 NOTE — TELEPHONE ENCOUNTER
BILL;PT CALLED  PT CALLING TO REQUEST AN INCREASE IN DOSAGE OF LEXAPRO -PT STILL HAVING SYMPTOMS BUT FEELS AND INCREASE IS NEEDED    PHARMACY [facility]    [phone]

## 2019-11-10 DIAGNOSIS — F32.A ANXIETY AND DEPRESSION: ICD-10-CM

## 2019-11-10 DIAGNOSIS — F41.9 ANXIETY AND DEPRESSION: ICD-10-CM

## 2019-11-13 RX ORDER — ESCITALOPRAM OXALATE 10 MG/1
TABLET ORAL
Qty: 90 TABLET | Refills: 0 | OUTPATIENT
Start: 2019-11-13

## 2019-11-13 RX ORDER — AMITRIPTYLINE HYDROCHLORIDE 25 MG/1
TABLET, FILM COATED ORAL
Qty: 90 TABLET | Refills: 0 | OUTPATIENT
Start: 2019-11-13

## 2019-12-17 ENCOUNTER — OFFICE VISIT (OUTPATIENT)
Dept: FAMILY MEDICINE CLINIC | Facility: CLINIC | Age: 24
End: 2019-12-17

## 2019-12-17 VITALS
BODY MASS INDEX: 40.04 KG/M2 | WEIGHT: 226 LBS | DIASTOLIC BLOOD PRESSURE: 82 MMHG | SYSTOLIC BLOOD PRESSURE: 128 MMHG | RESPIRATION RATE: 18 BRPM | TEMPERATURE: 97.6 F | HEIGHT: 63 IN | HEART RATE: 76 BPM

## 2019-12-17 DIAGNOSIS — J01.00 ACUTE NON-RECURRENT MAXILLARY SINUSITIS: ICD-10-CM

## 2019-12-17 DIAGNOSIS — J45.41 MODERATE PERSISTENT ASTHMA WITH EXACERBATION: Primary | ICD-10-CM

## 2019-12-17 PROCEDURE — 99214 OFFICE O/P EST MOD 30 MIN: CPT | Performed by: NURSE PRACTITIONER

## 2019-12-17 RX ORDER — BROMPHENIRAMINE MALEATE, PSEUDOEPHEDRINE HYDROCHLORIDE, AND DEXTROMETHORPHAN HYDROBROMIDE 2; 30; 10 MG/5ML; MG/5ML; MG/5ML
5 SYRUP ORAL 4 TIMES DAILY PRN
Qty: 120 ML | Refills: 0 | Status: SHIPPED | OUTPATIENT
Start: 2019-12-17 | End: 2020-04-21

## 2019-12-17 RX ORDER — AZITHROMYCIN 250 MG/1
TABLET, FILM COATED ORAL
Qty: 6 TABLET | Refills: 0 | Status: SHIPPED | OUTPATIENT
Start: 2019-12-17 | End: 2020-04-21

## 2019-12-17 RX ORDER — BUDESONIDE AND FORMOTEROL FUMARATE DIHYDRATE 160; 4.5 UG/1; UG/1
2 AEROSOL RESPIRATORY (INHALATION) 2 TIMES DAILY
Qty: 10.2 INHALER | Refills: 11 | Status: SHIPPED | OUTPATIENT
Start: 2019-12-17 | End: 2019-12-19 | Stop reason: CLARIF

## 2019-12-17 RX ORDER — METHYLPREDNISOLONE 4 MG/1
TABLET ORAL
Qty: 21 TABLET | Refills: 0 | Status: SHIPPED | OUTPATIENT
Start: 2019-12-17 | End: 2020-04-21

## 2019-12-17 RX ORDER — ALBUTEROL SULFATE 90 UG/1
2 AEROSOL, METERED RESPIRATORY (INHALATION) EVERY 4 HOURS PRN
Qty: 1 INHALER | Refills: 5 | Status: SHIPPED | OUTPATIENT
Start: 2019-12-17 | End: 2020-04-21 | Stop reason: SDUPTHER

## 2019-12-17 NOTE — PROGRESS NOTES
Subjective   Ely Acevedo is a 24 y.o. female.     History of Present Illness   Persistent cough, nasal and sinus congestion, ear pain for the past 2 weeks  Trying to deal with sx on her own  Has asthma, wheezing, tightness in chest, feeling pretty bad and pain in back from cough.  Productive cough with yellow thick sputum  No fever or chills, no HA or body aches, cough keeping her up at night  Needs a refill on her inhalers for asthma    Sister just had a baby and she needs to up date her Tdap    The following portions of the patient's history were reviewed and updated as appropriate: allergies, current medications, past family history, past medical history, past social history, past surgical history and problem list.    Review of Systems   Constitutional: Positive for fatigue. Negative for chills and fever.   HENT: Positive for congestion, ear pain, postnasal drip, rhinorrhea and sneezing.    Eyes: Negative.    Respiratory: Positive for cough, chest tightness, shortness of breath and wheezing.    Gastrointestinal: Negative.    Musculoskeletal: Positive for back pain (due to cough) and myalgias. Negative for neck pain and neck stiffness.   Skin: Negative.    Allergic/Immunologic: Positive for environmental allergies.   Neurological: Negative for dizziness, light-headedness and headache.   Hematological: Negative.    Psychiatric/Behavioral: Positive for sleep disturbance.       Objective   Physical Exam   Constitutional: She is oriented to person, place, and time. She appears well-developed and well-nourished. No distress.   HENT:   Head: Normocephalic and atraumatic.   Right Ear: External ear normal. Tympanic membrane is erythematous and bulging.   Left Ear: External ear normal. Tympanic membrane is bulging. Tympanic membrane is not erythematous.   Nose: Mucosal edema, rhinorrhea and congestion present. Right sinus exhibits maxillary sinus tenderness. Right sinus exhibits no frontal sinus tenderness. Left  sinus exhibits maxillary sinus tenderness. Left sinus exhibits no frontal sinus tenderness.   Mouth/Throat: Uvula is midline, oropharynx is clear and moist and mucous membranes are normal. No oropharyngeal exudate, posterior oropharyngeal edema or posterior oropharyngeal erythema.   Eyes: Lids are normal.   Neck: Neck supple.   Cardiovascular: Normal rate, regular rhythm, S1 normal, S2 normal and normal heart sounds.   Pulmonary/Chest: Effort normal and breath sounds normal. No stridor. No respiratory distress. She has no wheezes. She has no rhonchi. She has no rales.   Deep wet cough, no wheezing or rales noted   Abdominal: Soft.   Musculoskeletal: Normal range of motion. She exhibits no edema.   Lymphadenopathy:        Head (right side): No tonsillar, no preauricular, no posterior auricular and no occipital adenopathy present.        Head (left side): No tonsillar, no preauricular, no posterior auricular and no occipital adenopathy present.     She has no cervical adenopathy.   Neurological: She is alert and oriented to person, place, and time.   Skin: Skin is warm, dry and intact. Capillary refill takes less than 2 seconds. She is not diaphoretic. No cyanosis.   Psychiatric: She has a normal mood and affect. Her speech is normal and behavior is normal. Judgment and thought content normal. Cognition and memory are normal.   Nursing note and vitals reviewed.        Assessment/Plan   Ely was seen today for persistant cough and uri.    Diagnoses and all orders for this visit:    Acute non-recurrent maxillary sinusitis  -     azithromycin (ZITHROMAX) 250 MG tablet; Take 2 tablets the first day, then 1 tablet daily for 4 days.    Moderate persistent asthma with exacerbation  -     budesonide-formoterol (SYMBICORT) 160-4.5 MCG/ACT inhaler; Inhale 2 puffs 2 (Two) Times a Day.  -     albuterol sulfate  (90 Base) MCG/ACT inhaler; Inhale 2 puffs Every 4 (Four) Hours As Needed for Wheezing.  -      methylPREDNISolone (MEDROL, CONNOR,) 4 MG tablet; Take as directed on package instructions.  -     brompheniramine-pseudoephedrine-DM 30-2-10 MG/5ML syrup; Take 5 mL by mouth 4 (Four) Times a Day As Needed for Congestion, Cough or Allergies.    Other orders  -     Tdap (ADACEL) 5-2-15.5 LF-MCG/0.5 injection; Inject 0.5 mL into the appropriate muscle as directed by prescriber 1 (One) Time for 1 dose.      Take meds as directed  Will give prescription for Tdap to get at pharmacy  F/U if not improving or to ER if difficulty breathing. pt agrees

## 2019-12-19 ENCOUNTER — TELEPHONE (OUTPATIENT)
Dept: FAMILY MEDICINE CLINIC | Facility: CLINIC | Age: 24
End: 2019-12-19

## 2019-12-19 NOTE — TELEPHONE ENCOUNTER
Pt called stating her budesonide-formoterol (SYMBICORT) 160-4.5 MCG/ACT inhaler isnt covered with her insurance she request for another RX [facility] , confirmed

## 2019-12-23 ENCOUNTER — TELEPHONE (OUTPATIENT)
Dept: FAMILY MEDICINE CLINIC | Facility: CLINIC | Age: 24
End: 2019-12-23

## 2019-12-23 NOTE — TELEPHONE ENCOUNTER
Patient stated that the Advair that was prescribed is over $100 and would like to know if she could be prescribed a different medication, something cheaper or if there is a sample available she would like to try that. (see telephone encounter from 12/19/2019)  She is still coughing and experiencing the same symptoms as before. Please call patient and advise treatment options. Patient call back number is [phone].    [facility] [place]

## 2019-12-23 NOTE — TELEPHONE ENCOUNTER
She may want to discuss cost of medications with her pharmacist as we do not know what cost will be.    New script sent in for dulera.

## 2019-12-30 ENCOUNTER — TELEPHONE (OUTPATIENT)
Dept: FAMILY MEDICINE CLINIC | Facility: CLINIC | Age: 24
End: 2019-12-30

## 2019-12-30 NOTE — TELEPHONE ENCOUNTER
Patient is wanting to know can someone send in a prescription for breo inhaler because the cost of the advair is to expensive.       [facility]

## 2020-02-17 DIAGNOSIS — F41.9 ANXIETY AND DEPRESSION: ICD-10-CM

## 2020-02-17 DIAGNOSIS — F32.A ANXIETY AND DEPRESSION: ICD-10-CM

## 2020-02-17 NOTE — TELEPHONE ENCOUNTER
"LM for pt to call us back. Is she wanting to restart this? Or does [facility] \"[facility]?\" It had been removed from her med list.  "

## 2020-02-18 RX ORDER — ESCITALOPRAM OXALATE 20 MG/1
20 TABLET ORAL DAILY
Qty: 90 TABLET | Refills: 0 | Status: SHIPPED | OUTPATIENT
Start: 2020-02-18 | End: 2020-04-21 | Stop reason: SDUPTHER

## 2020-02-18 RX ORDER — ESCITALOPRAM OXALATE 20 MG/1
TABLET ORAL
Qty: 90 TABLET | Refills: 1 | OUTPATIENT
Start: 2020-02-18

## 2020-02-18 NOTE — TELEPHONE ENCOUNTER
Spoke with patient, she had stopped the escitalopram 10 mg and was switched to the 20 mg qd.  Her insurance is ending at the end of the month and wanted to refill before then. Looks like it was removed in error from med list on 11/10/2019. Will send in 90 day rx for patient to pharmacy. Patient aware.

## 2020-04-21 ENCOUNTER — OFFICE VISIT (OUTPATIENT)
Dept: FAMILY MEDICINE CLINIC | Facility: CLINIC | Age: 25
End: 2020-04-21

## 2020-04-21 VITALS
TEMPERATURE: 97.6 F | OXYGEN SATURATION: 99 % | HEART RATE: 72 BPM | SYSTOLIC BLOOD PRESSURE: 124 MMHG | BODY MASS INDEX: 42.88 KG/M2 | HEIGHT: 63 IN | DIASTOLIC BLOOD PRESSURE: 82 MMHG | WEIGHT: 242 LBS

## 2020-04-21 DIAGNOSIS — E66.01 CLASS 3 SEVERE OBESITY DUE TO EXCESS CALORIES WITH BODY MASS INDEX (BMI) OF 40.0 TO 44.9 IN ADULT, UNSPECIFIED WHETHER SERIOUS COMORBIDITY PRESENT (HCC): ICD-10-CM

## 2020-04-21 DIAGNOSIS — E88.81 INSULIN RESISTANCE: ICD-10-CM

## 2020-04-21 DIAGNOSIS — Z13.220 SCREENING, LIPID: ICD-10-CM

## 2020-04-21 DIAGNOSIS — F32.A ANXIETY AND DEPRESSION: ICD-10-CM

## 2020-04-21 DIAGNOSIS — J45.20 MILD INTERMITTENT ASTHMA WITHOUT COMPLICATION: ICD-10-CM

## 2020-04-21 DIAGNOSIS — F41.9 ANXIETY AND DEPRESSION: ICD-10-CM

## 2020-04-21 DIAGNOSIS — R53.83 OTHER FATIGUE: ICD-10-CM

## 2020-04-21 DIAGNOSIS — Z30.011 OCP (ORAL CONTRACEPTIVE PILLS) INITIATION: Primary | ICD-10-CM

## 2020-04-21 PROBLEM — E88.819 INSULIN RESISTANCE: Status: ACTIVE | Noted: 2020-04-21

## 2020-04-21 PROBLEM — E66.813 CLASS 3 SEVERE OBESITY DUE TO EXCESS CALORIES WITH BODY MASS INDEX (BMI) OF 40.0 TO 44.9 IN ADULT: Status: ACTIVE | Noted: 2020-04-21

## 2020-04-21 LAB
B-HCG UR QL: NEGATIVE
INTERNAL NEGATIVE CONTROL: NEGATIVE
INTERNAL POSITIVE CONTROL: POSITIVE
Lab: NORMAL

## 2020-04-21 PROCEDURE — 81025 URINE PREGNANCY TEST: CPT | Performed by: NURSE PRACTITIONER

## 2020-04-21 PROCEDURE — 99214 OFFICE O/P EST MOD 30 MIN: CPT | Performed by: NURSE PRACTITIONER

## 2020-04-21 RX ORDER — ALBUTEROL SULFATE 90 UG/1
2 AEROSOL, METERED RESPIRATORY (INHALATION) EVERY 4 HOURS PRN
Qty: 1 INHALER | Refills: 5 | Status: SHIPPED | OUTPATIENT
Start: 2020-04-21 | End: 2023-01-30 | Stop reason: SDUPTHER

## 2020-04-21 RX ORDER — BUSPIRONE HYDROCHLORIDE 5 MG/1
5 TABLET ORAL 3 TIMES DAILY
Qty: 90 TABLET | Refills: 2 | Status: SHIPPED | OUTPATIENT
Start: 2020-04-21 | End: 2021-04-07 | Stop reason: ALTCHOICE

## 2020-04-21 RX ORDER — ESCITALOPRAM OXALATE 20 MG/1
20 TABLET ORAL DAILY
Qty: 90 TABLET | Refills: 0 | Status: SHIPPED | OUTPATIENT
Start: 2020-04-21 | End: 2020-10-27

## 2020-04-21 NOTE — PROGRESS NOTES
Subjective   Ely Acevedo is a 24 y.o. female.     History of Present Illness   OCP in the past; wants to resume it; had a Rhona IUD but it came out.   LMP was Feb 2020   Irregular menstrual cycle for the past several months but prior to that regular monthly cycle.   not currently wanting to get pregnant  Does not think she is pregnant    Asthma  Stable on Albuterol inhaler only using one time a week  Not currently taking any allergy medications    Insulin resistance  Taking Metformin  Denies PCOS  Family hx of T2DM    Feeling tired a lot of the time, has not had thyroid labs checked in some time  She would like to have labs cholesterol, insulin and general labs checked    Anxiety not controlled at this time  Depression doing okay on Lexapro  Would like to change or add medication.  Under some stress and just started a new job. Weight gain of 15 lbs  Sleeping okay  No suicidal thoughts        The following portions of the patient's history were reviewed and updated as appropriate: allergies, current medications, past family history, past medical history, past social history, past surgical history and problem list.    Review of Systems   Constitutional: Positive for fatigue and unexpected weight gain. Negative for activity change, appetite change, chills and diaphoresis.   HENT: Negative.    Eyes: Negative.    Respiratory: Negative for cough, chest tightness, shortness of breath and wheezing.    Cardiovascular: Negative.  Negative for chest pain and palpitations.   Gastrointestinal: Negative.        Objective   Physical Exam   Constitutional: She is oriented to person, place, and time. She appears well-developed and well-nourished. No distress.   HENT:   Head: Normocephalic and atraumatic.   Right Ear: External ear normal.   Left Ear: External ear normal.   Nose: Nose normal.   Mouth/Throat: Oropharynx is clear and moist.   Eyes: Pupils are equal, round, and reactive to light.   Neck: Normal range of  motion. Neck supple. No thyromegaly present.   Cardiovascular: Normal rate, regular rhythm and normal heart sounds. Exam reveals no gallop and no friction rub.   No murmur heard.  Pulmonary/Chest: Effort normal and breath sounds normal. No respiratory distress. She has no wheezes. She has no rales.   Musculoskeletal: Normal range of motion. She exhibits no edema.   Lymphadenopathy:     She has no cervical adenopathy.   Neurological: She is alert and oriented to person, place, and time. She has normal reflexes.   Skin: Skin is warm and dry. She is not diaphoretic.   Psychiatric: Her speech is normal and behavior is normal. Judgment and thought content normal. Cognition and memory are normal. She exhibits a depressed mood.   Nursing note and vitals reviewed.        Assessment/Plan   Ely was seen today for discuss lexapro, change? and start back on ocp.    Diagnoses and all orders for this visit:    OCP (oral contraceptive pills) initiation  -     POCT pregnancy, urine    Mild intermittent asthma without complication  -     CBC & Differential  -     albuterol sulfate  (90 Base) MCG/ACT inhaler; Inhale 2 puffs Every 4 (Four) Hours As Needed for Wheezing.    Insulin resistance  -     Hemoglobin A1c  -     NMR LipoProfile  -     Insulin, Random    Other fatigue  -     TSH  -     Comprehensive Metabolic Panel    Anxiety and depression  -     TSH  -     Comprehensive Metabolic Panel  -     busPIRone (BUSPAR) 5 MG tablet; Take 1 tablet by mouth 3 (Three) Times a Day.  -     escitalopram (LEXAPRO) 20 MG tablet; Take 1 tablet by mouth Daily.    Screening, lipid  -     NMR LipoProfile      Negative urine HCG pt informed  will contact [facility] pharmacy to check what OCP pt has taken in the past, (Junel) has several refills on if from Dr Sims her Gyn  Will check labs and notify pt of results  The BMI is not in acceptable range. Pt advised that she needs to lose weight, eat smaller portions, make healthy food choices,  exercise regularly.

## 2020-04-23 LAB
ALBUMIN SERPL-MCNC: 4.4 G/DL (ref 3.5–5.2)
ALBUMIN/GLOB SERPL: 1.6 G/DL
ALP SERPL-CCNC: 58 U/L (ref 39–117)
ALT SERPL-CCNC: 33 U/L (ref 1–33)
AST SERPL-CCNC: 22 U/L (ref 1–32)
BASOPHILS # BLD AUTO: 0.03 10*3/MM3 (ref 0–0.2)
BASOPHILS NFR BLD AUTO: 0.4 % (ref 0–1.5)
BILIRUB SERPL-MCNC: 0.7 MG/DL (ref 0.2–1.2)
BUN SERPL-MCNC: 12 MG/DL (ref 6–20)
BUN/CREAT SERPL: 16 (ref 7–25)
CALCIUM SERPL-MCNC: 9.4 MG/DL (ref 8.6–10.5)
CHLORIDE SERPL-SCNC: 105 MMOL/L (ref 98–107)
CHOLEST SERPL-MCNC: 160 MG/DL (ref 100–199)
CO2 SERPL-SCNC: 28.5 MMOL/L (ref 22–29)
CREAT SERPL-MCNC: 0.75 MG/DL (ref 0.57–1)
EOSINOPHIL # BLD AUTO: 0.17 10*3/MM3 (ref 0–0.4)
EOSINOPHIL NFR BLD AUTO: 2 % (ref 0.3–6.2)
ERYTHROCYTE [DISTWIDTH] IN BLOOD BY AUTOMATED COUNT: 12.1 % (ref 12.3–15.4)
GLOBULIN SER CALC-MCNC: 2.7 GM/DL
GLUCOSE SERPL-MCNC: 96 MG/DL (ref 65–99)
HBA1C MFR BLD: 5.49 % (ref 4.8–5.6)
HCT VFR BLD AUTO: 41.7 % (ref 34–46.6)
HDL SERPL-SCNC: 30 UMOL/L
HDLC SERPL-MCNC: 48 MG/DL
HGB BLD-MCNC: 14.2 G/DL (ref 12–15.9)
IMM GRANULOCYTES # BLD AUTO: 0.02 10*3/MM3 (ref 0–0.05)
IMM GRANULOCYTES NFR BLD AUTO: 0.2 % (ref 0–0.5)
INSULIN SERPL-ACNC: 20 UIU/ML
LDL SERPL QN: 21 NM
LDL SERPL-SCNC: 1127 NMOL/L
LDL SMALL SERPL-SCNC: 432 NMOL/L
LDLC SERPL CALC-MCNC: 97 MG/DL (ref 0–99)
LYMPHOCYTES # BLD AUTO: 2.25 10*3/MM3 (ref 0.7–3.1)
LYMPHOCYTES NFR BLD AUTO: 27.1 % (ref 19.6–45.3)
MCH RBC QN AUTO: 29.9 PG (ref 26.6–33)
MCHC RBC AUTO-ENTMCNC: 34.1 G/DL (ref 31.5–35.7)
MCV RBC AUTO: 87.8 FL (ref 79–97)
MONOCYTES # BLD AUTO: 0.51 10*3/MM3 (ref 0.1–0.9)
MONOCYTES NFR BLD AUTO: 6.1 % (ref 5–12)
NEUTROPHILS # BLD AUTO: 5.32 10*3/MM3 (ref 1.7–7)
NEUTROPHILS NFR BLD AUTO: 64.2 % (ref 42.7–76)
NRBC BLD AUTO-RTO: 0 /100 WBC (ref 0–0.2)
PLATELET # BLD AUTO: 296 10*3/MM3 (ref 140–450)
POTASSIUM SERPL-SCNC: 4.5 MMOL/L (ref 3.5–5.2)
PROT SERPL-MCNC: 7.1 G/DL (ref 6–8.5)
RBC # BLD AUTO: 4.75 10*6/MM3 (ref 3.77–5.28)
SODIUM SERPL-SCNC: 144 MMOL/L (ref 136–145)
TRIGL SERPL-MCNC: 73 MG/DL (ref 0–149)
TSH SERPL DL<=0.005 MIU/L-ACNC: 2.06 UIU/ML (ref 0.27–4.2)
WBC # BLD AUTO: 8.3 10*3/MM3 (ref 3.4–10.8)

## 2020-04-24 DIAGNOSIS — R53.83 OTHER FATIGUE: Primary | ICD-10-CM

## 2020-04-29 ENCOUNTER — RESULTS ENCOUNTER (OUTPATIENT)
Dept: FAMILY MEDICINE CLINIC | Facility: CLINIC | Age: 25
End: 2020-04-29

## 2020-04-29 DIAGNOSIS — R53.83 OTHER FATIGUE: ICD-10-CM

## 2020-04-30 LAB
IRON SATN MFR SERPL: 26 % (ref 15–55)
IRON SERPL-MCNC: 89 UG/DL (ref 27–159)
TIBC SERPL-MCNC: 344 UG/DL (ref 250–450)
UIBC SERPL-MCNC: 255 UG/DL (ref 131–425)
VIT B12 SERPL-MCNC: 342 PG/ML (ref 232–1245)

## 2020-06-16 ENCOUNTER — TELEPHONE (OUTPATIENT)
Dept: FAMILY MEDICINE CLINIC | Facility: CLINIC | Age: 25
End: 2020-06-16

## 2020-06-16 DIAGNOSIS — J45.20 MILD INTERMITTENT ASTHMA WITHOUT COMPLICATION: Primary | ICD-10-CM

## 2020-06-16 DIAGNOSIS — J45.40 MODERATE PERSISTENT ASTHMA WITHOUT COMPLICATION: ICD-10-CM

## 2020-06-16 NOTE — TELEPHONE ENCOUNTER
Regarding: RE: Referral Request  Contact: [phone]  ----- Message from Zeina Forman MA sent at 6/15/2020  2:19 PM EDT -----       ----- Message from Ely Acevedo to Soumya Patiño APRN sent at 6/15/2020  2:06 PM -----   I do have a rescue inhaler. I'm just not sure how to address this particular issue.     ----- Message -----  From: MARY Treadwell  Sent: 6/11/20, 3:21 PM  To: Ely Acevedo  Subject: RE: Referral Request    I am willing to do whatever you'd like, we can treat you here or I can refer you to asthma specialist. Do you have a rescue inhaler? You can use that if you are wheezing. If you don't have one I will send it in.    ----- Message -----     From: Ely Acevedo     Sent: 6/10/2020 11:06 AM EDT       To: MARY Treadwell  Subject: Referral Request    Hello,    I have a recurring issue with my asthma where I cough until I vomit. It happens about once every other week, sometimes more. It occurred again last night. I'm curious as to whether I could get a referral for an ENT or other asthma specialist who could help me or if your office could help.    Thank you!   Maxine WEINSTEIN

## 2020-10-24 DIAGNOSIS — F41.9 ANXIETY AND DEPRESSION: ICD-10-CM

## 2020-10-24 DIAGNOSIS — F32.A ANXIETY AND DEPRESSION: ICD-10-CM

## 2020-10-27 RX ORDER — ESCITALOPRAM OXALATE 20 MG/1
TABLET ORAL
Qty: 90 TABLET | Refills: 0 | Status: SHIPPED | OUTPATIENT
Start: 2020-10-27 | End: 2021-04-07 | Stop reason: ALTCHOICE

## 2021-03-17 ENCOUNTER — TRANSCRIBE ORDERS (OUTPATIENT)
Dept: ADMINISTRATIVE | Facility: HOSPITAL | Age: 26
End: 2021-03-17

## 2021-03-17 DIAGNOSIS — M25.559 HIP PAIN: Primary | ICD-10-CM

## 2021-03-17 DIAGNOSIS — R10.2 PAIN IN PELVIS: ICD-10-CM

## 2021-04-07 ENCOUNTER — OFFICE VISIT (OUTPATIENT)
Dept: FAMILY MEDICINE CLINIC | Facility: CLINIC | Age: 26
End: 2021-04-07

## 2021-04-07 VITALS
HEART RATE: 102 BPM | HEIGHT: 63 IN | BODY MASS INDEX: 42.21 KG/M2 | SYSTOLIC BLOOD PRESSURE: 140 MMHG | TEMPERATURE: 99 F | DIASTOLIC BLOOD PRESSURE: 95 MMHG | WEIGHT: 238.2 LBS | RESPIRATION RATE: 20 BRPM

## 2021-04-07 DIAGNOSIS — E78.6 HYPERLIPIDEMIA WITH LOW HDL: ICD-10-CM

## 2021-04-07 DIAGNOSIS — E78.5 HYPERLIPIDEMIA WITH LOW HDL: ICD-10-CM

## 2021-04-07 DIAGNOSIS — E88.81 INSULIN RESISTANCE: ICD-10-CM

## 2021-04-07 DIAGNOSIS — R53.83 OTHER FATIGUE: ICD-10-CM

## 2021-04-07 DIAGNOSIS — Z13.220 SCREENING, LIPID: ICD-10-CM

## 2021-04-07 DIAGNOSIS — Z11.59 ENCOUNTER FOR HEPATITIS C SCREENING TEST FOR LOW RISK PATIENT: ICD-10-CM

## 2021-04-07 DIAGNOSIS — I10 ESSENTIAL HYPERTENSION: Primary | ICD-10-CM

## 2021-04-07 DIAGNOSIS — E55.9 VITAMIN D DEFICIENCY: ICD-10-CM

## 2021-04-07 PROCEDURE — 99214 OFFICE O/P EST MOD 30 MIN: CPT | Performed by: NURSE PRACTITIONER

## 2021-04-07 RX ORDER — PROPRANOLOL HCL 60 MG
60 CAPSULE, EXTENDED RELEASE 24HR ORAL DAILY
Qty: 30 CAPSULE | Refills: 1 | Status: SHIPPED | OUTPATIENT
Start: 2021-04-07 | End: 2021-04-15 | Stop reason: ALTCHOICE

## 2021-04-07 RX ORDER — OMEPRAZOLE 40 MG/1
CAPSULE, DELAYED RELEASE ORAL DAILY
COMMUNITY
Start: 2021-02-26 | End: 2023-01-30

## 2021-04-07 RX ORDER — CLONAZEPAM 0.5 MG/1
TABLET ORAL
COMMUNITY
Start: 2020-12-07 | End: 2023-01-30

## 2021-04-07 RX ORDER — ACETAMINOPHEN AND CODEINE PHOSPHATE 120; 12 MG/5ML; MG/5ML
1 SOLUTION ORAL DAILY
COMMUNITY
Start: 2021-03-24 | End: 2021-05-05

## 2021-04-07 RX ORDER — SERTRALINE HYDROCHLORIDE 100 MG/1
100 TABLET, FILM COATED ORAL DAILY
COMMUNITY
Start: 2021-03-19 | End: 2023-01-30

## 2021-04-07 NOTE — PROGRESS NOTES
"Chief Complaint  elevated BP at GYN visit    Subjective          Ely Acevedo presents to [facility]  History of Present Illness  HTN  BP elevated over the past several visits to GYN office  Changed her OCP to Norethindrone.  Up to date on pap smear.  Having HAs but denies CP, dizziness, heart palpitations, SOA, swelling  No known family hx of HTN  She is under more stress with getting her masters and working and some weight gain, eating more processed foods  Recently joined gym again   Asthma stable  Seeing allergist, using inhalers as directed  Anxiety is stable  Taking Sertraline, Clonazepam daily     Objective   Vital Signs:   /95   Pulse 102   Temp 99 °F (37.2 °C)   Resp 20   Ht 160 cm (62.99\")   Wt 108 kg (238 lb 3.2 oz)   BMI 42.21 kg/m²     Physical Exam  Vitals and nursing note reviewed.   Constitutional:       General: She is not in acute distress.     Appearance: Normal appearance. She is well-developed. She is not ill-appearing.   HENT:      Head: Normocephalic.      Right Ear: External ear normal.      Left Ear: External ear normal.      Nose: Nose normal.   Eyes:      General: Lids are normal.   Neck:      Thyroid: No thyromegaly.      Vascular: No JVD.   Cardiovascular:      Rate and Rhythm: Normal rate and regular rhythm.      Pulses: Normal pulses.      Heart sounds: Normal heart sounds, S1 normal and S2 normal.   Pulmonary:      Effort: Pulmonary effort is normal.      Breath sounds: Normal breath sounds.   Musculoskeletal:      Cervical back: Neck supple.   Skin:     General: Skin is warm and dry.      Capillary Refill: Capillary refill takes less than 2 seconds.   Neurological:      Mental Status: She is alert and oriented to person, place, and time.   Psychiatric:         Speech: Speech normal.         Behavior: Behavior normal.         Thought Content: Thought content normal.         Judgment: Judgment normal.        Result Review :            "      Assessment and Plan    Diagnoses and all orders for this visit:    1. Essential hypertension (Primary)  -     propranolol LA (Inderal LA) 60 MG 24 hr capsule; Take 1 capsule by mouth Daily.  Dispense: 30 capsule; Refill: 1  -     Comprehensive Metabolic Panel; Future  -     CBC & Differential; Future    2. Other fatigue  -     CBC & Differential; Future  -     Vitamin B12; Future    3. Screening, lipid    4. Vitamin D deficiency  -     Vitamin D 25 Hydroxy; Future    5. Insulin resistance  -     Hemoglobin A1c; Future  -     Insulin, Total; Future    6. Hyperlipidemia with low HDL  -     Lipid Panel; Future    7. Encounter for hepatitis C screening test for low risk patient  -     Hepatitis C antibody; Future        Follow Up   Return in about 4 weeks (around 5/5/2021) for Recheck.  Patient was given instructions and counseling regarding her condition or for health maintenance advice. Please see specific information pulled into the AVS if appropriate.     Monitor BP periodically   Take Propranolol 60 mg daily; discussed possible side effects; eat low sodium diet and less processed foods, more whole foods fresh and steamed vegetables.  Pt to return at her convenience for fasting labs. Will notify of results and see pt back in 4 weeks for recheck

## 2021-04-14 ENCOUNTER — LAB (OUTPATIENT)
Dept: FAMILY MEDICINE CLINIC | Facility: CLINIC | Age: 26
End: 2021-04-14

## 2021-04-14 ENCOUNTER — TELEPHONE (OUTPATIENT)
Dept: FAMILY MEDICINE CLINIC | Facility: CLINIC | Age: 26
End: 2021-04-14

## 2021-04-14 NOTE — TELEPHONE ENCOUNTER
PATIENT STATES SINCE STARTING THE BP MEDICATION PROPRANOLOL 60 MG TABLET SHE IS HAVING PROBLEMS WITH HER ASTHMA.  SHE WANTS TO KNOW IF THERE IS ANOTHER MEDICATION SHE CAN SWITCH TO THAT WONT EFFECT HER ASTHMA.

## 2021-04-15 DIAGNOSIS — I10 ESSENTIAL HYPERTENSION: Primary | ICD-10-CM

## 2021-04-15 RX ORDER — LISINOPRIL 10 MG/1
10 TABLET ORAL DAILY
Qty: 90 TABLET | Refills: 1 | Status: SHIPPED | OUTPATIENT
Start: 2021-04-15 | End: 2021-08-24

## 2021-04-16 DIAGNOSIS — E55.9 VITAMIN D DEFICIENCY: Primary | ICD-10-CM

## 2021-04-16 RX ORDER — ERGOCALCIFEROL 1.25 MG/1
50000 CAPSULE ORAL WEEKLY
Qty: 15 CAPSULE | Refills: 3 | Status: SHIPPED | OUTPATIENT
Start: 2021-04-16 | End: 2023-01-30

## 2021-05-01 DIAGNOSIS — I10 ESSENTIAL HYPERTENSION: ICD-10-CM

## 2021-05-03 RX ORDER — PROPRANOLOL HCL 60 MG
CAPSULE, EXTENDED RELEASE 24HR ORAL
Qty: 30 CAPSULE | Refills: 1 | OUTPATIENT
Start: 2021-05-03

## 2021-05-05 ENCOUNTER — OFFICE VISIT (OUTPATIENT)
Dept: FAMILY MEDICINE CLINIC | Facility: CLINIC | Age: 26
End: 2021-05-05

## 2021-05-05 VITALS
SYSTOLIC BLOOD PRESSURE: 130 MMHG | HEIGHT: 63 IN | BODY MASS INDEX: 42.24 KG/M2 | DIASTOLIC BLOOD PRESSURE: 80 MMHG | WEIGHT: 238.4 LBS | TEMPERATURE: 97.5 F | HEART RATE: 82 BPM | RESPIRATION RATE: 20 BRPM | OXYGEN SATURATION: 98 %

## 2021-05-05 DIAGNOSIS — I10 ESSENTIAL HYPERTENSION: Primary | ICD-10-CM

## 2021-05-05 DIAGNOSIS — K21.9 GASTROESOPHAGEAL REFLUX DISEASE WITHOUT ESOPHAGITIS: ICD-10-CM

## 2021-05-05 PROCEDURE — 99213 OFFICE O/P EST LOW 20 MIN: CPT | Performed by: NURSE PRACTITIONER

## 2021-05-05 NOTE — PROGRESS NOTES
"Chief Complaint  Follow-up (NOTE: pt is NOT fasting today ), Hypertension, Hyperglycemia, Hyperlipidemia, and Vitamin D Deficiency    Subjective          Ely Acevedo presents to [facility]  History of Present Illness    Follow up HTN  Still taking Propranolol 60 mg but feeling very tired, flare up of asthma also  Did not change BP med to Lisinopril 10mg.  Not checking BP does not have a cuff    Vit D def  Taking weekly Vit D supplement     GERD  Stable on Omeprazole   Needs a refill    Objective   Vital Signs:   /80   Pulse 82   Temp 97.5 °F (36.4 °C)   Resp 20   Ht 160 cm (62.99\")   Wt 108 kg (238 lb 6.4 oz)   SpO2 98%   BMI 42.24 kg/m²     Physical Exam  Vitals and nursing note reviewed.   Constitutional:       General: She is not in acute distress.     Appearance: Normal appearance. She is well-developed. She is not ill-appearing.   HENT:      Head: Normocephalic.      Right Ear: External ear normal.      Left Ear: External ear normal.      Nose: Nose normal.   Eyes:      General: Lids are normal.   Cardiovascular:      Rate and Rhythm: Normal rate and regular rhythm.      Heart sounds: Normal heart sounds, S1 normal and S2 normal.   Pulmonary:      Effort: Pulmonary effort is normal.      Breath sounds: Normal breath sounds.   Musculoskeletal:      Cervical back: Neck supple.   Skin:     General: Skin is warm and dry.   Neurological:      Mental Status: She is alert and oriented to person, place, and time.   Psychiatric:         Speech: Speech normal.         Behavior: Behavior normal.         Thought Content: Thought content normal.         Judgment: Judgment normal.        Result Review :                 Assessment and Plan    Diagnoses and all orders for this visit:    1. Essential hypertension (Primary)    2. Gastroesophageal reflux disease without esophagitis        Follow Up   Return in about 4 months (around 9/5/2021).  Patient was given instructions " and counseling regarding her condition or for health maintenance advice. Please see specific information pulled into the AVS if appropriate.     Will have pt stop propranolol and start lisinopril 10 mg daily. Discussed possible side effects of stopping beta blocker  Goal BP is 120/80 - 138/89 if staying elevated above these need to call back in 2 weeks to let me know and will adjust medication  Will see pt back in 3-4 months for recheck of Vit D levels

## 2021-05-05 NOTE — PATIENT INSTRUCTIONS
Hypertension, Adult  Hypertension is another name for high blood pressure. High blood pressure forces your heart to work harder to pump blood. This can cause problems over time.  There are two numbers in a blood pressure reading. There is a top number (systolic) over a bottom number (diastolic). It is best to have a blood pressure that is below 120/80. Healthy choices can help lower your blood pressure, or you may need medicine to help lower it.  What are the causes?  The cause of this condition is not known. Some conditions may be related to high blood pressure.  What increases the risk?  · Smoking.  · Having type 2 diabetes mellitus, high cholesterol, or both.  · Not getting enough exercise or physical activity.  · Being overweight.  · Having too much fat, sugar, calories, or salt (sodium) in your diet.  · Drinking too much alcohol.  · Having long-term (chronic) kidney disease.  · Having a family history of high blood pressure.  · Age. Risk increases with age.  · Race. You may be at higher risk if you are .  · Gender. Men are at higher risk than women before age 45. After age 65, women are at higher risk than men.  · Having obstructive sleep apnea.  · Stress.  What are the signs or symptoms?  · High blood pressure may not cause symptoms. Very high blood pressure (hypertensive crisis) may cause:  ? Headache.  ? Feelings of worry or nervousness (anxiety).  ? Shortness of breath.  ? Nosebleed.  ? A feeling of being sick to your stomach (nausea).  ? Throwing up (vomiting).  ? Changes in how you see.  ? Very bad chest pain.  ? Seizures.  How is this treated?  · This condition is treated by making healthy lifestyle changes, such as:  ? Eating healthy foods.  ? Exercising more.  ? Drinking less alcohol.  · Your health care provider may prescribe medicine if lifestyle changes are not enough to get your blood pressure under control, and if:  ? Your top number is above 130.  ? Your bottom number is above  80.  · Your personal target blood pressure may vary.  Follow these instructions at home:  Eating and drinking    · If told, follow the DASH eating plan. To follow this plan:  ? Fill one half of your plate at each meal with fruits and vegetables.  ? Fill one fourth of your plate at each meal with whole grains. Whole grains include whole-wheat pasta, brown rice, and whole-grain bread.  ? Eat or drink low-fat dairy products, such as skim milk or low-fat yogurt.  ? Fill one fourth of your plate at each meal with low-fat (lean) proteins. Low-fat proteins include fish, chicken without skin, eggs, beans, and tofu.  ? Avoid fatty meat, cured and processed meat, or chicken with skin.  ? Avoid pre-made or processed food.  · Eat less than 1,500 mg of salt each day.  · Do not drink alcohol if:  ? Your doctor tells you not to drink.  ? You are pregnant, may be pregnant, or are planning to become pregnant.  · If you drink alcohol:  ? Limit how much you use to:  § 0-1 drink a day for women.  § 0-2 drinks a day for men.  ? Be aware of how much alcohol is in your drink. In the [place] one drink equals one 12 oz bottle of beer (355 mL), one 5 oz glass of wine (148 mL), or one 1½ oz glass of hard liquor (44 mL).  Lifestyle    · Work with your doctor to stay at a healthy weight or to lose weight. Ask your doctor what the best weight is for you.  · Get at least 30 minutes of exercise most days of the week. This may include walking, swimming, or biking.  · Get at least 30 minutes of exercise that strengthens your muscles (resistance exercise) at least 3 days a week. This may include lifting weights or doing Pilates.  · Do not use any products that contain nicotine or tobacco, such as cigarettes, e-cigarettes, and chewing tobacco. If you need help quitting, ask your doctor.  · Check your blood pressure at home as told by your doctor.  · Keep all follow-up visits as told by your doctor. This is important.  Medicines  · Take over-the-counter  and prescription medicines only as told by your doctor. Follow directions carefully.  · Do not skip doses of blood pressure medicine. The medicine does not work as well if you skip doses. Skipping doses also puts you at risk for problems.  · Ask your doctor about side effects or reactions to medicines that you should watch for.  Contact a doctor if you:  · Think you are having a reaction to the medicine you are taking.  · Have headaches that keep coming back (recurring).  · Feel dizzy.  · Have swelling in your ankles.  · Have trouble with your vision.  Get help right away if you:  · Get a very bad headache.  · Start to feel mixed up (confused).  · Feel weak or numb.  · Feel faint.  · Have very bad pain in your:  ? Chest.  ? Belly (abdomen).  · Throw up more than once.  · Have trouble breathing.  Summary  · Hypertension is another name for high blood pressure.  · High blood pressure forces your heart to work harder to pump blood.  · For most people, a normal blood pressure is less than 120/80.  · Making healthy choices can help lower blood pressure. If your blood pressure does not get lower with healthy choices, you may need to take medicine.  This information is not intended to replace advice given to you by your health care provider. Make sure you discuss any questions you have with your health care provider.  Document Revised: 08/28/2019 Document Reviewed: 08/28/2019  [employer] Patient Education © 2021 [employer]    How to Take Your Blood Pressure  Blood pressure is a measurement of how strongly your blood is pressing against the walls of your arteries. Arteries are blood vessels that carry blood from your heart throughout your body. Your health care provider takes your blood pressure at each office visit. You can also take your own blood pressure at home with a blood pressure monitor.  You may need to take your own blood pressure to:  · Confirm a diagnosis of high blood pressure (hypertension).  · Monitor your  "blood pressure over time.  · Make sure your blood pressure medicine is working.  Supplies needed:  · Blood pressure monitor.  · Dining room chair to sit in.  · Table or desk.  · Small notebook and pencil or pen.  How to prepare  To get the most accurate reading, avoid the following for 30 minutes before you check your blood pressure:  · Drinking caffeine.  · Drinking alcohol.  · Eating.  · Smoking.  · Exercising.  Five minutes before you check your blood pressure:  · Use the bathroom and urinate so that you have an empty bladder.  · Sit quietly in a dining room chair. Do not sit in a soft couch or an armchair. Do not talk.  How to take your blood pressure  To check your blood pressure, follow the instructions in the manual that came with your blood pressure monitor. If you have a digital blood pressure monitor, the instructions may be as follows:  1. Sit up straight in a chair.  2. Place your feet on the floor. Do not cross your ankles or legs.  3. Rest your left arm at the level of your heart on a table or desk or on the arm of a chair.  4. Pull up your shirt sleeve.  5. Wrap the blood pressure cuff around the upper part of your left arm, 1 inch (2.5 cm) above your elbow. It is best to wrap the cuff around bare skin.  6. Fit the cuff snugly around your arm. You should be able to place only one finger between the cuff and your arm.  7. Position the cord so that it rests in the bend of your elbow.  8. Press the power button.  9. Sit quietly while the cuff inflates and deflates.  10. Read the digital reading on the monitor screen and write the numbers down (record them) in a notebook.  11. Wait 2-3 minutes, then repeat the steps, starting at step 1.  What does my blood pressure reading mean?  A blood pressure reading consists of a higher number over a lower number. Ideally, your blood pressure should be below 120/80. The first (\"top\") number is called the systolic pressure. It is a measure of the pressure in your " "arteries as your heart beats. The second (\"bottom\") number is called the diastolic pressure. It is a measure of the pressure in your arteries as the heart relaxes.  Blood pressure is classified into five stages. The following are the stages for adults who do not have a short-term serious illness or a chronic condition. Systolic pressure and diastolic pressure are measured in a unit called mm Hg (millimeters of mercury).   Normal  · Systolic pressure: below 120.  · Diastolic pressure: below 80.  Elevated  · Systolic pressure: 120-129.  · Diastolic pressure: below 80.  Hypertension stage 1  · Systolic pressure: 130-139.  · Diastolic pressure: 80-89.  Hypertension stage 2  · Systolic pressure: 140 or above.  · Diastolic pressure: 90 or above.  You can have elevated blood pressure or hypertension even if only the systolic or only the diastolic number in your reading is higher than normal.  Follow these instructions at home:  · Check your blood pressure as often as recommended by your health care provider.  · Check your blood pressure at the same time every day.  · Take your monitor to the next appointment with your health care provider to make sure that:  ? You are using it correctly.  ? It provides accurate readings.  · Be sure you understand what your goal blood pressure numbers are.  · Tell your health care provider if you are having any side effects from blood pressure medicine.  · Keep all follow-up visits as told by your health care provider. This is important.  General tips  · Your health care provider can suggest a reliable monitor that will meet your needs. There are several types of home blood pressure monitors.  · Choose a monitor that has an arm cuff. Do not choose a monitor that measures your blood pressure from your wrist or finger.  · Choose a cuff that wraps snugly around your upper arm. You should be able to fit only one finger between your arm and the cuff.  · You can buy a blood pressure monitor at " most drugstores or online.  Where to find more information  American Heart Association: www.heart.org  Contact a health care provider if:  · Your blood pressure is consistently high.  Get help right away if:  · Your systolic blood pressure is higher than 180.  · Your diastolic blood pressure is higher than 120.  Summary  · Blood pressure is a measurement of how strongly your blood is pressing against the walls of your arteries.  · A blood pressure reading consists of a higher number over a lower number. Ideally, your blood pressure should be below 120/80.  · Check your blood pressure at the same time every day.  · Avoid caffeine, alcohol, smoking, and exercise for 30 minutes prior to checking your blood pressure. These agents can affect the accuracy of the blood pressure reading.  This information is not intended to replace advice given to you by your health care provider. Make sure you discuss any questions you have with your health care provider.  Document Revised: 12/11/2020 Document Reviewed: 12/11/2020  [employer] Patient Education © 2021 [employer]

## 2021-08-23 ENCOUNTER — TELEPHONE (OUTPATIENT)
Dept: FAMILY MEDICINE CLINIC | Facility: CLINIC | Age: 26
End: 2021-08-23

## 2021-08-23 NOTE — TELEPHONE ENCOUNTER
Caller: Ely Acevedo    Relationship: Self    Best call back number: [phone]    What medication are you requesting: LOSARTAN     What are your current symptoms:CONSTANT COUGH    PATIENT IS ASTHMATIC AND HER ALLERGIST DOCTOR STATED THAT LISINOPRIL CAUSES CHRONIC COUGHING AND IS REQUESTING THAT PATIENT'S BP MEDICATION BE CHANGED TO LOSARTAN.      PLEASE CALL IN NEW PRESCRIPTION FOR LOSARTAN AND CALL PATIENT AND ADVISE WHEN IT IS COMPLETED.   THANK YOU.       How long have you been experiencing symptoms: A COUPLE WEEKS AND MORE PROMINENT AT NIGHT.    Have you had these symptoms before:    [x] Yes  [] No    Have you been treated for these symptoms before:   [x] Yes  [] No    If a prescription is needed, what is your preferred pharmacy and phone number: [id number] - [place], [place] - [address] AT [address] - [phone] ph - [phone] FX

## 2021-08-24 DIAGNOSIS — I10 ESSENTIAL HYPERTENSION: Primary | ICD-10-CM

## 2021-08-24 RX ORDER — LOSARTAN POTASSIUM 50 MG/1
50 TABLET ORAL DAILY
Qty: 90 TABLET | Refills: 1 | Status: SHIPPED | OUTPATIENT
Start: 2021-08-24 | End: 2023-01-30 | Stop reason: SDUPTHER

## 2021-08-24 NOTE — TELEPHONE ENCOUNTER
Called informed pt    Chief Complaint   Patient presents with   • Well Child       Child accompanied by mother    Concerns: Mom thinks head is asymmetrical. Just wants to make sure its okay.     He was seen last week with mucus in stool, found to have MPA. He is not very fussy as long as he is being held.     Breast feeding: YES   Minutes per feeding: varies, 30 - 1 hr, at night longer    Frequency of feedings: 2-3   Vitamin D: YES  Bottlefeeding: YES   Type of formula or breast milk: nutramigen    Amount per feedin    Frequency of feedings: 4-6 times per day  Patient sleeps in basinet   On back YES   Without pillows, thick blankets or bumpers: YES  Sleeps 3-4 hours at a time  Carseat in back seat rear-facing YES  Urinary output (per day) 7-8  Stools (per day) 6    Development:   Patient can do the following:     smile spontaneously   eyes follow objects to midline   respond to sound   lift head and turn while prone   kick legs equally      Social History:    : NO  Smoke exposure: NO  Household members:  mom and dad  Guns:  NO   If yes, in locked area?  Not applicable  Pets: none    Current Outpatient Medications   Medication Sig Dispense Refill   • Cholecalciferol (VITAMIN D INFANT PO)        No current facility-administered medications for this visit.        ALLERGIES:  No Known Allergies    Birth History   • Birth     Length: 20.7\" (52.6 cm)     Weight: 3.34 kg     HC 34.9 cm (13.75\")   • Apgar     One: 7     Five: 9   • Delivery Method: , Low Transverse   • Gestation Age: 37 2/7 wks       History reviewed. No pertinent past medical history.    Patient Active Problem List   Diagnosis   • Cost infant of 37 completed weeks of gestation   • Milk protein allergy       History reviewed. No pertinent family history.      PHYSICAL EXAMINATION:    Visit Vitals  Pulse 156   Temp 98.6 °F (37 °C) (Temporal)   Resp 56   Ht 21.75\" (55.2 cm)   Wt 3.799 kg   HC 37.5 cm (14.76\")   BMI 12.45 kg/m²     7 %ile (Z= -1.47) based on  WHO (Boys, 0-2 years) weight-for-age data using vitals from 2019.  49 %ile (Z= -0.02) based on WHO (Boys, 0-2 years) Length-for-age data based on Length recorded on 2019.  48 %ile (Z= -0.05) based on WHO (Boys, 0-2 years) head circumference-for-age based on Head Circumference recorded on 2019.  <1 %ile (Z= -2.35) based on WHO (Boys, 0-2 years) weight-for-recumbent length data based on body measurements available as of 2019.    GENERAL:  Well appearing 5 week old male, nontoxic, no acute distress.  Alert and interactive.  SKIN: Warm, pink, normal turgor.  No cyanosis.  No rash.  HEAD:  Normocephalic, atraumatic.  Anterior fontanelle open, soft, and flat.  EYES:  Conjunctivae normal, non-injected, non-icteric. Red reflex positive bilaterally.  NOSE:  Nares patent.  EARS:  Normal pinnae, normal canals, tympanic membranes are transparent with good landmarks.  THROAT:  Oropharynx with moist mucous membranes and no lesions.   NECK:  Supple.  HEART:  Regular rate and rhythm.  Normal S1, S2.  No murmurs, rubs, or gallops. Pulses 2+ bilaterally.  LUNGS:  Clear to auscultation bilaterally.  No wheezes, rales, rhonchi.  Non-labored breathing.  ABDOMEN:  Soft, nontender, non-distended.   No organomegaly or masses.  GENITOURINARY:  normal male, testes descended bilaterally  MUSCULOSKELETAL: Full range of motion, no deformities, strength 5/5 bilaterally.  EXTREMITIES:  Hips stable, negative Ortolani and Lemus.   NEUROLOGIC:  Alert, active, positive braxton, grasp, and suck reflexes.     ASSESSMENT AND PLAN:  5 week old male  here for a well-child exam. Good growth and development.    Discussed with parent:    Temperature taking  Call office if rectal temperature greater than or equal to 100.4  Avoid direct sun and cold exposure      Safe sleeping position    Vaccinations:  Up to date    Post partum depression score: 7    Adequate but slow weight gain (weight for height at first percentile). Discussed options  of increasing supplementation (he seems hungry, but unsure if that is just seeking comfort) or minimizing supplementation for 4 days. Either way recheck weight in 4 days.     MPA: on nutramigen and mom dairy free x1 week.     Follow-up at 2 months of age for well .

## 2021-08-24 NOTE — TELEPHONE ENCOUNTER
Stop Lisinopril and start Losartan for BP. Monitor BP to make sure we have the correct dose goal 120/80).  New prescription sent to [facility] pharmacy. [email or web address]

## 2022-01-04 ENCOUNTER — TELEPHONE (OUTPATIENT)
Dept: FAMILY MEDICINE CLINIC | Facility: CLINIC | Age: 27
End: 2022-01-04

## 2022-09-30 ENCOUNTER — TELEPHONE (OUTPATIENT)
Dept: FAMILY MEDICINE CLINIC | Facility: CLINIC | Age: 27
End: 2022-09-30

## 2023-01-30 ENCOUNTER — OFFICE VISIT (OUTPATIENT)
Dept: FAMILY MEDICINE CLINIC | Facility: CLINIC | Age: 28
End: 2023-01-30
Payer: COMMERCIAL

## 2023-01-30 VITALS
HEART RATE: 68 BPM | BODY MASS INDEX: 48.73 KG/M2 | TEMPERATURE: 97.1 F | HEIGHT: 63 IN | WEIGHT: 275 LBS | DIASTOLIC BLOOD PRESSURE: 80 MMHG | SYSTOLIC BLOOD PRESSURE: 130 MMHG | RESPIRATION RATE: 20 BRPM

## 2023-01-30 DIAGNOSIS — M54.9 MUSCULOSKELETAL BACK PAIN: ICD-10-CM

## 2023-01-30 DIAGNOSIS — K21.9 GASTROESOPHAGEAL REFLUX DISEASE WITHOUT ESOPHAGITIS: ICD-10-CM

## 2023-01-30 DIAGNOSIS — N39.3 PRIMARY STRESS URINARY INCONTINENCE: ICD-10-CM

## 2023-01-30 DIAGNOSIS — M54.9 UPPER BACK PAIN: ICD-10-CM

## 2023-01-30 DIAGNOSIS — I10 PRIMARY HYPERTENSION: Primary | ICD-10-CM

## 2023-01-30 DIAGNOSIS — J45.40 MODERATE PERSISTENT ASTHMA WITHOUT COMPLICATION: ICD-10-CM

## 2023-01-30 DIAGNOSIS — F41.9 ANXIETY: ICD-10-CM

## 2023-01-30 DIAGNOSIS — E55.9 VITAMIN D DEFICIENCY: ICD-10-CM

## 2023-01-30 PROCEDURE — 99214 OFFICE O/P EST MOD 30 MIN: CPT | Performed by: FAMILY MEDICINE

## 2023-01-30 RX ORDER — OMEPRAZOLE 20 MG/1
20 CAPSULE, DELAYED RELEASE ORAL DAILY
Qty: 90 CAPSULE | Refills: 3 | Status: SHIPPED | OUTPATIENT
Start: 2023-01-30

## 2023-01-30 RX ORDER — SERTRALINE HYDROCHLORIDE 25 MG/1
25 TABLET, FILM COATED ORAL DAILY
COMMUNITY
End: 2023-01-30 | Stop reason: SDUPTHER

## 2023-01-30 RX ORDER — OMEPRAZOLE 20 MG/1
20 CAPSULE, DELAYED RELEASE ORAL DAILY
COMMUNITY
End: 2023-01-30 | Stop reason: SDUPTHER

## 2023-01-30 RX ORDER — LOSARTAN POTASSIUM 100 MG/1
100 TABLET ORAL DAILY
Qty: 90 TABLET | Refills: 1 | Status: SHIPPED | OUTPATIENT
Start: 2023-01-30

## 2023-01-30 RX ORDER — METFORMIN HYDROCHLORIDE 500 MG/1
500 TABLET, EXTENDED RELEASE ORAL
COMMUNITY

## 2023-01-30 RX ORDER — ALBUTEROL SULFATE 90 UG/1
2 AEROSOL, METERED RESPIRATORY (INHALATION) EVERY 4 HOURS PRN
Qty: 18 G | Refills: 11 | Status: SHIPPED | OUTPATIENT
Start: 2023-01-30

## 2023-01-30 NOTE — ASSESSMENT & PLAN NOTE
Last vitamin D level was 19 in April 2022.  Continue vitamin D supplementation and repeat in future

## 2023-01-30 NOTE — ASSESSMENT & PLAN NOTE
Asthma is improving with treatment.  The patient is experiencing no daytime asthma symptoms. She is experiencing no nighttime asthma symptoms.  Medications: continue Breztri and as needed albuterol.

## 2023-01-30 NOTE — PROGRESS NOTES
"Chief Complaint   Patient presents with   • Re-establish PCP      Pt most recently seeing [facility] Here in the same complex.    • Hypertension   • Depression   • Anxiety   • Heartburn       Subjective      Ely Acevedo is a 27 y.o. who presents for hypertension, GERD, anxiety, moderate persistent asthma.    Hypertension.  Does not monitor blood pressure at home.  Takes medicine regularly.    Anxiety.  Patient reports symptoms of anxiety and increased.  She is currently taking sertraline 25 mg and has been on this dose for the last year.    GERD.  Well-controlled as long she takes her omeprazole.    Asthma.  Previously saw asthma and allergy.  Has been stable on Breztri.  Uses albuterol rescue inhaler approximately twice per week but admits this is more out of precaution than it is to treat symptoms.    PCOS.  Sees local gynecology.    Low back pain.  This is a new complaint patient brought up during the visit.  She has had 2 urinary tract infections over the last several months.  She notes some discomfort in the lower back and questions whether this could be related to her kidneys.  She also notes small leakage of urine with coughing, sneezing, position changes.    Upper back pain.  Patient also brought up a chronic issue of upper back pain related to large breasts.  Patient has seen a chiropractor in the past for her back pain.  She inquires about the process of breast reduction.    The following portions of the patient's history were reviewed and updated as appropriate: allergies, current medications, past family history, past medical history, past social history, past surgical history and problem list.    Review of Systems    Objective   Vital Signs:  /80   Pulse 68   Temp 97.1 °F (36.2 °C)   Resp 20   Ht 160 cm (63\")   Wt 125 kg (275 lb)   BMI 48.71 kg/m²             Physical Exam  Vitals reviewed.   Constitutional:       Appearance: Normal appearance.   Cardiovascular:      Rate and " Rhythm: Normal rate and regular rhythm.      Pulses: Normal pulses.   Pulmonary:      Effort: Pulmonary effort is normal.      Breath sounds: Normal breath sounds.   Abdominal:      Tenderness: There is no right CVA tenderness or left CVA tenderness.   Musculoskeletal:      Comments: Tenderness to palpation of lumbar musculature and myofascia right more so than left.   Neurological:      Mental Status: She is alert.          Result Review                     Assessment and Plan  Diagnoses and all orders for this visit:    1. Primary hypertension (Primary)  Assessment & Plan:  Hypertension is improving with treatment.  Dietary sodium restriction.  Medication changes per orders.  Blood pressure will be reassessed at the next regular appointment.    Orders:  -     losartan (COZAAR) 100 MG tablet; Take 1 tablet by mouth Daily.  Dispense: 90 tablet; Refill: 1    2. Gastroesophageal reflux disease without esophagitis  Assessment & Plan:  Condition is controlled.  Continue omeprazole.  Refilled for 1 year    Orders:  -     omeprazole (priLOSEC) 20 MG capsule; Take 1 capsule by mouth Daily.  Dispense: 90 capsule; Refill: 3    3. Moderate persistent asthma without complication  Assessment & Plan:  Asthma is improving with treatment.  The patient is experiencing no daytime asthma symptoms. She is experiencing no nighttime asthma symptoms.  Medications: continue Breztri and as needed albuterol.        Orders:  -     Budeson-Glycopyrrol-Formoterol (BREZTRI) 160-9-4.8 MCG/ACT aerosol inhaler; Inhale 2 puffs 2 (Two) Times a Day.  Dispense: 1 each; Refill: 11  -     albuterol sulfate  (90 Base) MCG/ACT inhaler; Inhale 2 puffs Every 4 (Four) Hours As Needed for Wheezing.  Dispense: 18 g; Refill: 11    4. Vitamin D deficiency  Assessment & Plan:  Last vitamin D level was 19 in April 2022.  Continue vitamin D supplementation and repeat in future      5. Primary stress urinary incontinence  Assessment & Plan:  Patient is  familiar with Kegel exercises and I recommended initiation of these      6. Musculoskeletal back pain    7. Anxiety  Assessment & Plan:  Not under ideal control.  Increase sertraline to 50 mg.  Follow-up in 6 weeks    Orders:  -     sertraline (ZOLOFT) 50 MG tablet; Take 1 tablet by mouth Daily.  Dispense: 90 tablet; Refill: 0    8. 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 Discussed PT referral to begin process for potential reduction. Patient will think about his.           Follow Up  Return in about 6 weeks (around 3/13/2023) for Recheck Anxiety.  Patient was given instructions and counseling regarding her condition or for health maintenance advice. Please see specific information pulled into the AVS if appropriate.

## 2023-01-30 NOTE — ASSESSMENT & PLAN NOTE
Hypertension is improving with treatment.  Dietary sodium restriction.  Medication changes per orders.  Blood pressure will be reassessed at the next regular appointment.

## 2023-02-06 ENCOUNTER — PATIENT ROUNDING (BHMG ONLY) (OUTPATIENT)
Dept: FAMILY MEDICINE CLINIC | Facility: CLINIC | Age: 28
End: 2023-02-06
Payer: COMMERCIAL

## 2023-02-06 NOTE — PROGRESS NOTES
A My-Chart message has been sent to the patient for PATIENT ROUNDING with [facility] [place].

## 2023-03-06 ENCOUNTER — TELEPHONE (OUTPATIENT)
Dept: FAMILY MEDICINE CLINIC | Facility: CLINIC | Age: 28
End: 2023-03-06

## 2023-03-06 NOTE — TELEPHONE ENCOUNTER
----- Message from Ely Acevedo sent at 3/4/2023 12:05 PM EST -----  Regarding: Cancel please   Contact: [phone]  Hello, I need to reschedule my appointment for 3/10. I just changed jobs and have not gotten my new insurance set up yet.     Thank you,  Maxine Acevedo

## 2023-04-24 DIAGNOSIS — F41.9 ANXIETY: ICD-10-CM

## 2023-05-09 ENCOUNTER — OFFICE VISIT (OUTPATIENT)
Dept: FAMILY MEDICINE CLINIC | Facility: CLINIC | Age: 28
End: 2023-05-09
Payer: COMMERCIAL

## 2023-05-09 VITALS
RESPIRATION RATE: 18 BRPM | TEMPERATURE: 97.1 F | WEIGHT: 273 LBS | HEIGHT: 63 IN | DIASTOLIC BLOOD PRESSURE: 86 MMHG | HEART RATE: 88 BPM | SYSTOLIC BLOOD PRESSURE: 122 MMHG | BODY MASS INDEX: 48.37 KG/M2

## 2023-05-09 DIAGNOSIS — E55.9 VITAMIN D DEFICIENCY: ICD-10-CM

## 2023-05-09 DIAGNOSIS — K21.9 GASTROESOPHAGEAL REFLUX DISEASE WITHOUT ESOPHAGITIS: ICD-10-CM

## 2023-05-09 DIAGNOSIS — F41.9 ANXIETY: ICD-10-CM

## 2023-05-09 DIAGNOSIS — J45.40 MODERATE PERSISTENT ASTHMA WITHOUT COMPLICATION: ICD-10-CM

## 2023-05-09 DIAGNOSIS — I10 PRIMARY HYPERTENSION: Primary | ICD-10-CM

## 2023-05-09 DIAGNOSIS — G47.00 INSOMNIA, UNSPECIFIED TYPE: ICD-10-CM

## 2023-05-09 PROCEDURE — 99214 OFFICE O/P EST MOD 30 MIN: CPT | Performed by: FAMILY MEDICINE

## 2023-05-09 RX ORDER — DOXEPIN HYDROCHLORIDE 10 MG/1
10 CAPSULE ORAL NIGHTLY
Qty: 30 CAPSULE | Refills: 2 | Status: SHIPPED | OUTPATIENT
Start: 2023-05-09

## 2023-05-09 RX ORDER — OMEPRAZOLE 20 MG/1
20 CAPSULE, DELAYED RELEASE ORAL DAILY
Qty: 90 CAPSULE | Refills: 3 | Status: SHIPPED | OUTPATIENT
Start: 2023-05-09

## 2023-05-09 RX ORDER — ALBUTEROL SULFATE 1.25 MG/3ML
1 SOLUTION RESPIRATORY (INHALATION) EVERY 6 HOURS PRN
Qty: 90 ML | Refills: 12 | Status: SHIPPED | OUTPATIENT
Start: 2023-05-09

## 2023-05-09 RX ORDER — SERTRALINE HYDROCHLORIDE 100 MG/1
100 TABLET, FILM COATED ORAL DAILY
Qty: 90 TABLET | Refills: 0 | Status: SHIPPED | OUTPATIENT
Start: 2023-05-09

## 2023-05-09 RX ORDER — ALBUTEROL SULFATE 90 UG/1
2 AEROSOL, METERED RESPIRATORY (INHALATION) EVERY 4 HOURS PRN
Qty: 18 G | Refills: 11 | Status: SHIPPED | OUTPATIENT
Start: 2023-05-09

## 2023-05-09 RX ORDER — HYDROCHLOROTHIAZIDE 12.5 MG/1
12.5 TABLET ORAL DAILY
Qty: 90 TABLET | Refills: 0 | Status: SHIPPED | OUTPATIENT
Start: 2023-05-09

## 2023-05-09 NOTE — PROGRESS NOTES
"Chief Complaint   Patient presents with   • Hypertension     F/u and med refills    • Insomnia       Subjective      Ely Acevedo is a 27 y.o. who presents for HTN, Anxiety, insomnia.    HTN. Has not monitored BP    Anxiety. Still not controlled. She alludes to external stressors.    Insomnia. Trouble initiating and maintaining sleep for at least 2 years. Use melatonin with some effect.     Objective   Vital Signs:  /86   Pulse 88   Temp 97.1 °F (36.2 °C)   Resp 18   Ht 160 cm (63\")   Wt 124 kg (273 lb)   BMI 48.36 kg/m²     Physical Exam  Constitutional:       Appearance: Normal appearance.   Cardiovascular:      Rate and Rhythm: Normal rate and regular rhythm.      Pulses: Normal pulses.      Heart sounds: Normal heart sounds.   Pulmonary:      Effort: Pulmonary effort is normal.      Breath sounds: Normal breath sounds.   Neurological:      Mental Status: She is alert.          Result Review                     Assessment and Plan  Diagnoses and all orders for this visit:    1. Primary hypertension (Primary)  Comments:  Not controlled. Add HCTZ  Orders:  -     hydroCHLOROthiazide (HYDRODIURIL) 12.5 MG tablet; Take 1 tablet by mouth Daily.  Dispense: 90 tablet; Refill: 0  -     CBC & Differential; Future  -     Comprehensive Metabolic Panel; Future  -     Lipid Panel; Future    2. Anxiety  Comments:  Not controlled. Increase Zoloft to 100mg  Orders:  -     sertraline (ZOLOFT) 100 MG tablet; Take 1 tablet by mouth Daily.  Dispense: 90 tablet; Refill: 0    3. Insomnia, unspecified type  Comments:  Reviewed sleep hygiene and trial of doxepin  Orders:  -     doxepin (SINEquan) 10 MG capsule; Take 1 capsule by mouth Every Night.  Dispense: 30 capsule; Refill: 2    4. Moderate persistent asthma without complication  Comments:  Refilled albuterol neb solution  Orders:  -     albuterol (ACCUNEB) 1.25 MG/3ML nebulizer solution; Take 3 mL by nebulization Every 6 (Six) Hours As Needed for Wheezing.  " Dispense: 90 mL; Refill: 12  -     albuterol sulfate  (90 Base) MCG/ACT inhaler; Inhale 2 puffs Every 4 (Four) Hours As Needed for Wheezing.  Dispense: 18 g; Refill: 11    5. Vitamin D deficiency  Comments:  Surveillance labs ordered  Orders:  -     Vitamin D,25-Hydroxy; Future    6. Gastroesophageal reflux disease without esophagitis  -     omeprazole (priLOSEC) 20 MG capsule; Take 1 capsule by mouth Daily.  Dispense: 90 capsule; Refill: 3            Follow Up  Return in about 2 months (around 7/9/2023) for Recheck Anxiety, Insomnia, BP.  Patient was given instructions and counseling regarding her condition or for health maintenance advice. Please see specific information pulled into the AVS if appropriate.

## 2023-05-16 DIAGNOSIS — E66.01 CLASS 3 SEVERE OBESITY DUE TO EXCESS CALORIES WITH SERIOUS COMORBIDITY AND BODY MASS INDEX (BMI) OF 40.0 TO 44.9 IN ADULT: ICD-10-CM

## 2023-05-16 DIAGNOSIS — R94.5 NONSPECIFIC ABNORMAL RESULTS OF LIVER FUNCTION STUDY: ICD-10-CM

## 2023-05-16 DIAGNOSIS — E55.9 VITAMIN D DEFICIENCY: Primary | ICD-10-CM

## 2023-05-16 RX ORDER — ERGOCALCIFEROL 1.25 MG/1
50000 CAPSULE ORAL WEEKLY
Qty: 5 CAPSULE | Refills: 1 | Status: SHIPPED | OUTPATIENT
Start: 2023-05-16

## 2023-07-24 DIAGNOSIS — I10 PRIMARY HYPERTENSION: ICD-10-CM

## 2023-07-24 RX ORDER — LOSARTAN POTASSIUM 100 MG/1
TABLET ORAL
Qty: 90 TABLET | Refills: 0 | Status: SHIPPED | OUTPATIENT
Start: 2023-07-24

## 2023-07-25 DIAGNOSIS — F41.9 ANXIETY: ICD-10-CM

## 2023-08-05 DIAGNOSIS — I10 PRIMARY HYPERTENSION: ICD-10-CM

## 2023-08-07 RX ORDER — HYDROCHLOROTHIAZIDE 12.5 MG/1
TABLET ORAL
Qty: 90 TABLET | Refills: 0 | Status: SHIPPED | OUTPATIENT
Start: 2023-08-07

## 2023-08-13 ENCOUNTER — TELEPHONE (OUTPATIENT)
Dept: FAMILY MEDICINE CLINIC | Facility: CLINIC | Age: 28
End: 2023-08-13
Payer: COMMERCIAL

## 2023-08-13 DIAGNOSIS — E66.01 CLASS 3 SEVERE OBESITY DUE TO EXCESS CALORIES WITH SERIOUS COMORBIDITY AND BODY MASS INDEX (BMI) OF 40.0 TO 44.9 IN ADULT: ICD-10-CM

## 2023-08-14 RX ORDER — PHENTERMINE HYDROCHLORIDE 15 MG/1
CAPSULE ORAL
Qty: 30 CAPSULE | Refills: 0 | Status: SHIPPED | OUTPATIENT
Start: 2023-08-14

## 2023-08-15 DIAGNOSIS — K21.9 GASTROESOPHAGEAL REFLUX DISEASE WITHOUT ESOPHAGITIS: ICD-10-CM

## 2023-08-15 NOTE — TELEPHONE ENCOUNTER
"  Caller: Ely Acevedo \"Maxine\"    Relationship: Self    Best call back number: [phone]    Requested Prescriptions:   Requested Prescriptions     Pending Prescriptions Disp Refills    omeprazole (priLOSEC) 20 MG capsule 90 capsule 3     Sig: Take 1 capsule by mouth Daily.        Pharmacy where request should be sent: [facility]ARMACY #[id number] - [place] [address] - [phone] [facility] [phone] FX     Last office visit with prescribing clinician: 7/11/2023   Last telemedicine visit with prescribing clinician: Visit date not found   Next office visit with prescribing clinician: 8/22/2023     Additional details provided by patient:     Does the patient have less than a 3 day supply:  [x] Yes  [] No    Robert Radford   08/15/23 09:15 EDT         "

## 2023-08-16 RX ORDER — OMEPRAZOLE 20 MG/1
20 CAPSULE, DELAYED RELEASE ORAL DAILY
Qty: 90 CAPSULE | Refills: 3 | Status: SHIPPED | OUTPATIENT
Start: 2023-08-16

## 2023-08-22 ENCOUNTER — OFFICE VISIT (OUTPATIENT)
Dept: FAMILY MEDICINE CLINIC | Facility: CLINIC | Age: 28
End: 2023-08-22
Payer: COMMERCIAL

## 2023-08-22 VITALS
SYSTOLIC BLOOD PRESSURE: 128 MMHG | TEMPERATURE: 97.5 F | OXYGEN SATURATION: 97 % | HEIGHT: 63 IN | DIASTOLIC BLOOD PRESSURE: 98 MMHG | RESPIRATION RATE: 20 BRPM | HEART RATE: 88 BPM | WEIGHT: 269 LBS | BODY MASS INDEX: 47.66 KG/M2

## 2023-08-22 DIAGNOSIS — K76.0 NAFLD (NONALCOHOLIC FATTY LIVER DISEASE): ICD-10-CM

## 2023-08-22 DIAGNOSIS — E66.01 CLASS 3 SEVERE OBESITY DUE TO EXCESS CALORIES WITH SERIOUS COMORBIDITY AND BODY MASS INDEX (BMI) OF 45.0 TO 49.9 IN ADULT: ICD-10-CM

## 2023-08-22 DIAGNOSIS — E88.81 INSULIN RESISTANCE: ICD-10-CM

## 2023-08-22 DIAGNOSIS — K21.9 GASTROESOPHAGEAL REFLUX DISEASE WITHOUT ESOPHAGITIS: ICD-10-CM

## 2023-08-22 DIAGNOSIS — I10 PRIMARY HYPERTENSION: Primary | ICD-10-CM

## 2023-08-22 PROCEDURE — 99214 OFFICE O/P EST MOD 30 MIN: CPT | Performed by: FAMILY MEDICINE

## 2023-08-22 RX ORDER — SERTRALINE HYDROCHLORIDE 25 MG/1
25 TABLET, FILM COATED ORAL DAILY
COMMUNITY
Start: 2023-08-10

## 2023-08-22 RX ORDER — HYDROCHLOROTHIAZIDE 25 MG/1
25 TABLET ORAL DAILY
Qty: 30 TABLET | Refills: 1 | Status: SHIPPED | OUTPATIENT
Start: 2023-08-22

## 2023-08-22 NOTE — ASSESSMENT & PLAN NOTE
Patient's (Body mass index is 47.65 kg/mý.) indicates that they are morbidly/severely obese (BMI > 40 or > 35 with obesity - related health condition) with health conditions that include hypertension, GERD, and insulin resistance  . Weight is improving with treatment. BMI  is above average; BMI management plan is completed. We discussed portion control, increasing exercise, consulting a Bariatric surgeon, and pharmacologic options including phentermine 8 mg .    Trial of reduced dose phentermine of 8 mg daily will be started.  Patient will monitor for recurrence of headaches closely.  If tolerated topiramate may be able to provide some additional weight loss benefit.  Patient was made aware she meets criteria for bariatric procedure should she wish to pursue this.  She may contact her insurance about coverage of bariatric surgery

## 2023-08-22 NOTE — PROGRESS NOTES
"Chief Complaint   Patient presents with    1 mo f/u       Subjective      Ely Acevedo is a 27 y.o. who presents for hypertension and class III obesity.    Hypertension.  Taking medicines as prescribed.  Blood pressures are consistently in the 130s over 80s.    Obesity.  Patient's weight is down 6 pounds with use of phentermine 15 mg.  She believes initiation of phentermine led to an increase in headache frequency.  She has been out of the medication for the last week and feels like headaches are less intense and less frequent.  She has recognized on the medicine there are some behavioral changes she needs to work on, specifically the urge to eat all the food on her plate even when she is full.  Liver ultrasound has showed hepatic steatosis.  Other weight related conditions include GERD and insulin resistance.    The following portions of the patient's history were reviewed and updated as appropriate: allergies, current medications, past family history, past medical history, past social history, past surgical history, and problem list.    Review of Systems    Objective   Vital Signs:  /98   Pulse 88   Temp 97.5 øF (36.4 øC)   Resp 20   Ht 160 cm (63\")   Wt 122 kg (269 lb)   SpO2 97%   BMI 47.65 kg/mý               Physical Exam  Vitals reviewed.   Constitutional:       Appearance: Normal appearance.   Neurological:      Mental Status: She is alert.   Psychiatric:         Mood and Affect: Mood normal.        Result Review                     Assessment and Plan  Diagnoses and all orders for this visit:    1. Primary hypertension (Primary)  Comments:  Not at goal.  Increase HCTZ to 25 mg daily.  Continue losartan  Orders:  -     hydroCHLOROthiazide (HYDRODIURIL) 25 MG tablet; Take 1 tablet by mouth Daily.  Dispense: 30 tablet; Refill: 1    2. Class 3 severe obesity due to excess calories with serious comorbidity and body mass index (BMI) of 45.0 to 49.9 in adult  Assessment & Plan:  Patient's " (Body mass index is 47.65 kg/mý.) indicates that they are morbidly/severely obese (BMI > 40 or > 35 with obesity - related health condition) with health conditions that include hypertension, GERD, and insulin resistance  . Weight is improving with treatment. BMI  is above average; BMI management plan is completed. We discussed portion control, increasing exercise, consulting a Bariatric surgeon, and pharmacologic options including phentermine 8 mg .    Trial of reduced dose phentermine of 8 mg daily will be started.  Patient will monitor for recurrence of headaches closely.  If tolerated topiramate may be able to provide some additional weight loss benefit.  Patient was made aware she meets criteria for bariatric procedure should she wish to pursue this.  She may contact her insurance about coverage of bariatric surgery    Orders:  -     Phentermine HCl 8 MG tablet; Take 8 mg by mouth Daily.  Dispense: 30 each; Refill: 1    3. NAFLD (nonalcoholic fatty liver disease)  Comments:  Monitor LFTs every 6 months    4. Insulin resistance  Comments:  Weight related comorbidity    5. Gastroesophageal reflux disease without esophagitis  Comments:  Weight related condition            Follow Up  Return in about 1 month (around 9/22/2023) for RecheckBlood pressure and weight.  Patient was given instructions and counseling regarding her condition or for health maintenance advice. Please see specific information pulled into the AVS if appropriate.

## 2023-09-16 DIAGNOSIS — I10 PRIMARY HYPERTENSION: ICD-10-CM

## 2023-09-19 RX ORDER — SERTRALINE HYDROCHLORIDE 25 MG/1
25 TABLET, FILM COATED ORAL DAILY
Qty: 90 TABLET | Refills: 1 | Status: SHIPPED | OUTPATIENT
Start: 2023-09-19

## 2023-09-19 RX ORDER — HYDROCHLOROTHIAZIDE 25 MG/1
TABLET ORAL
Qty: 90 TABLET | Refills: 0 | Status: SHIPPED | OUTPATIENT
Start: 2023-09-19

## 2023-10-05 ENCOUNTER — OFFICE VISIT (OUTPATIENT)
Dept: FAMILY MEDICINE CLINIC | Facility: CLINIC | Age: 28
End: 2023-10-05
Payer: COMMERCIAL

## 2023-10-05 VITALS
DIASTOLIC BLOOD PRESSURE: 95 MMHG | RESPIRATION RATE: 20 BRPM | HEART RATE: 94 BPM | OXYGEN SATURATION: 97 % | WEIGHT: 265.2 LBS | BODY MASS INDEX: 46.99 KG/M2 | HEIGHT: 63 IN | TEMPERATURE: 97.9 F | SYSTOLIC BLOOD PRESSURE: 130 MMHG

## 2023-10-05 DIAGNOSIS — I10 PRIMARY HYPERTENSION: ICD-10-CM

## 2023-10-05 DIAGNOSIS — I10 PRIMARY HYPERTENSION: Primary | ICD-10-CM

## 2023-10-05 DIAGNOSIS — F41.9 ANXIETY: ICD-10-CM

## 2023-10-05 DIAGNOSIS — E66.01 CLASS 3 SEVERE OBESITY DUE TO EXCESS CALORIES WITH SERIOUS COMORBIDITY AND BODY MASS INDEX (BMI) OF 45.0 TO 49.9 IN ADULT: ICD-10-CM

## 2023-10-05 DIAGNOSIS — Z23 NEED FOR VACCINATION: ICD-10-CM

## 2023-10-05 RX ORDER — VALSARTAN 320 MG/1
320 TABLET ORAL DAILY
Qty: 30 TABLET | Refills: 2 | Status: SHIPPED | OUTPATIENT
Start: 2023-10-05

## 2023-10-05 RX ORDER — PROGESTERONE 200 MG/1
CAPSULE ORAL
COMMUNITY
Start: 2023-08-24

## 2023-10-05 RX ORDER — CETIRIZINE HYDROCHLORIDE 10 MG/1
10 TABLET ORAL DAILY
Qty: 30 TABLET | Refills: 2 | Status: SHIPPED | OUTPATIENT
Start: 2023-10-05

## 2023-10-05 RX ORDER — HYDROCHLOROTHIAZIDE 25 MG/1
25 TABLET ORAL DAILY
Qty: 90 TABLET | Refills: 0 | Status: SHIPPED | OUTPATIENT
Start: 2023-10-05

## 2023-10-05 NOTE — PROGRESS NOTES
"Chief Complaint   Patient presents with    Follow-up    Hypertension    weight loss efforts      Stopped Phentermine due to headaches     Anxiety     Discuss increasing Zoloft        Subjective      Ely Acevedo is a 27 y.o. who presents for diagnoses of HTN, Obesity, and concerns about worsening depression.     HTN. Checks bp infrequently. 's and DBP 80's.     Obesity. Patient experienced HA with low dose phentermine    Depressed Mood. Currently taking Sertraline 25 mg. Life events have contributed to depressed mood.     The following portions of the patient's history were reviewed and updated as appropriate: allergies, current medications, past family history, past medical history, past social history, past surgical history, and problem list.    Review of Systems    Objective   Vital Signs:  /95   Pulse 94   Temp 97.9 °F (36.6 °C)   Resp 20   Ht 160 cm (62.99\")   Wt 120 kg (265 lb 3.2 oz)   SpO2 97%   BMI 46.99 kg/m²               Physical Exam  Vitals reviewed.   Neurological:      Mental Status: She is alert.   Psychiatric:         Attention and Perception: Attention normal.         Mood and Affect: Affect is tearful.         Speech: Speech normal.         Behavior: Behavior normal.        Result Review                     Assessment and Plan  Diagnoses and all orders for this visit:    1. Primary hypertension (Primary)  Comments:  NOt controlled. Change Losartan to Valsartan.  Orders:  -     valsartan (DIOVAN) 320 MG tablet; Take 1 tablet by mouth Daily.  Dispense: 30 tablet; Refill: 2  -     hydroCHLOROthiazide (HYDRODIURIL) 25 MG tablet; Take 1 tablet by mouth Daily.  Dispense: 90 tablet; Refill: 0    2. Anxiety  Comments:  Increase sertraline to 50 mg  Orders:  -     sertraline (Zoloft) 50 MG tablet; Take 1 tablet by mouth Daily.  Dispense: 90 tablet; Refill: 1    3. Class 3 severe obesity due to excess calories with serious comorbidity and body mass index (BMI) of 45.0 to 49.9 " in adult  Comments:  Multiple ORCs. Has not tolerated phentermine. GLP-1 expensive. Pt. decided to postpone further discussion    4. Primary hypertension  Comments:  .  Orders:  -     valsartan (DIOVAN) 320 MG tablet; Take 1 tablet by mouth Daily.  Dispense: 30 tablet; Refill: 2  -     hydroCHLOROthiazide (HYDRODIURIL) 25 MG tablet; Take 1 tablet by mouth Daily.  Dispense: 90 tablet; Refill: 0    5. Need for vaccination  -     Fluzone >6 Months ([id number])    Other orders  -     cetirizine (zyrTEC) 10 MG tablet; Take 1 tablet by mouth Daily.  Dispense: 30 tablet; Refill: 2            Follow Up  Return in about 3 months (around 1/5/2024) for Next scheduled follow up.  Patient was given instructions and counseling regarding her condition or for health maintenance advice. Please see specific information pulled into the AVS if appropriate.

## 2023-10-17 DIAGNOSIS — J45.40 MODERATE PERSISTENT ASTHMA WITHOUT COMPLICATION: ICD-10-CM

## 2023-10-31 ENCOUNTER — PATIENT MESSAGE (OUTPATIENT)
Dept: FAMILY MEDICINE CLINIC | Facility: CLINIC | Age: 28
End: 2023-10-31
Payer: COMMERCIAL

## 2023-10-31 DIAGNOSIS — J45.40 MODERATE PERSISTENT ASTHMA WITHOUT COMPLICATION: Primary | ICD-10-CM

## 2023-11-01 NOTE — TELEPHONE ENCOUNTER
"I checked with the pharmacy and they told me the rejection came back as \"Trelegy is preferred\" drug.   "

## 2023-11-01 NOTE — TELEPHONE ENCOUNTER
From: Ely Acevedo  To: Amilcar Lambert  Sent: 10/31/2023 8:06 PM EDT  Subject: Breztri     Hello,     Breztri was going to cost over five hundred dollars. The pharmacy sent in a prior auth request but I don’t know that it went through. Is there a different daily inhaler you could prescribe me?

## 2023-11-03 DIAGNOSIS — I10 PRIMARY HYPERTENSION: ICD-10-CM

## 2023-11-03 RX ORDER — HYDROCHLOROTHIAZIDE 12.5 MG/1
TABLET ORAL
Qty: 90 TABLET | Refills: 0 | OUTPATIENT
Start: 2023-11-03

## 2023-11-04 DIAGNOSIS — I10 PRIMARY HYPERTENSION: ICD-10-CM

## 2023-11-06 RX ORDER — FLUTICASONE FUROATE AND VILANTEROL 200; 25 UG/1; UG/1
1 POWDER RESPIRATORY (INHALATION)
Qty: 1 EACH | Refills: 11 | Status: SHIPPED | OUTPATIENT
Start: 2023-11-06

## 2023-11-06 RX ORDER — LOSARTAN POTASSIUM 100 MG/1
TABLET ORAL
Qty: 90 TABLET | Refills: 0 | OUTPATIENT
Start: 2023-11-06

## 2023-12-31 DIAGNOSIS — I10 PRIMARY HYPERTENSION: ICD-10-CM

## 2024-01-03 RX ORDER — HYDROCHLOROTHIAZIDE 12.5 MG/1
TABLET ORAL
Qty: 90 TABLET | Refills: 0 | OUTPATIENT
Start: 2024-01-03

## 2024-01-04 DIAGNOSIS — I10 PRIMARY HYPERTENSION: ICD-10-CM

## 2024-01-04 RX ORDER — VALSARTAN 320 MG/1
320 TABLET ORAL DAILY
Qty: 90 TABLET | Refills: 1 | Status: SHIPPED | OUTPATIENT
Start: 2024-01-04

## 2024-01-15 DIAGNOSIS — E55.9 VITAMIN D DEFICIENCY: ICD-10-CM

## 2024-01-15 DIAGNOSIS — I10 PRIMARY HYPERTENSION: ICD-10-CM

## 2024-01-17 RX ORDER — HYDROCHLOROTHIAZIDE 25 MG/1
25 TABLET ORAL DAILY
Qty: 30 TABLET | Refills: 0 | Status: SHIPPED | OUTPATIENT
Start: 2024-01-17

## 2024-01-17 RX ORDER — ERGOCALCIFEROL 1.25 MG/1
50000 CAPSULE ORAL WEEKLY
Qty: 6 CAPSULE | Refills: 0 | Status: SHIPPED | OUTPATIENT
Start: 2024-01-17

## 2024-01-23 ENCOUNTER — OFFICE VISIT (OUTPATIENT)
Dept: FAMILY MEDICINE CLINIC | Facility: CLINIC | Age: 29
End: 2024-01-23
Payer: COMMERCIAL

## 2024-01-23 VITALS
DIASTOLIC BLOOD PRESSURE: 88 MMHG | HEIGHT: 63 IN | BODY MASS INDEX: 47.66 KG/M2 | OXYGEN SATURATION: 97 % | WEIGHT: 269 LBS | RESPIRATION RATE: 18 BRPM | HEART RATE: 94 BPM | SYSTOLIC BLOOD PRESSURE: 128 MMHG | TEMPERATURE: 97.1 F

## 2024-01-23 DIAGNOSIS — J45.40 MODERATE PERSISTENT ASTHMA WITHOUT COMPLICATION: ICD-10-CM

## 2024-01-23 DIAGNOSIS — E55.9 VITAMIN D DEFICIENCY: ICD-10-CM

## 2024-01-23 DIAGNOSIS — F41.9 ANXIETY: ICD-10-CM

## 2024-01-23 DIAGNOSIS — I10 PRIMARY HYPERTENSION: Primary | ICD-10-CM

## 2024-01-23 DIAGNOSIS — J06.9 ACUTE URI: ICD-10-CM

## 2024-01-23 DIAGNOSIS — E66.01 CLASS 3 SEVERE OBESITY DUE TO EXCESS CALORIES WITH SERIOUS COMORBIDITY AND BODY MASS INDEX (BMI) OF 45.0 TO 49.9 IN ADULT: ICD-10-CM

## 2024-01-23 PROCEDURE — 99214 OFFICE O/P EST MOD 30 MIN: CPT | Performed by: FAMILY MEDICINE

## 2024-01-23 RX ORDER — ALBUTEROL SULFATE 1.25 MG/3ML
1 SOLUTION RESPIRATORY (INHALATION) EVERY 6 HOURS PRN
Qty: 90 ML | Refills: 12 | Status: SHIPPED | OUTPATIENT
Start: 2024-01-23

## 2024-01-23 RX ORDER — VALSARTAN AND HYDROCHLOROTHIAZIDE 320; 25 MG/1; MG/1
1 TABLET, FILM COATED ORAL DAILY
Qty: 30 TABLET | Refills: 5 | Status: SHIPPED | OUTPATIENT
Start: 2024-01-23

## 2024-01-23 RX ORDER — ALBUTEROL SULFATE 90 UG/1
2 AEROSOL, METERED RESPIRATORY (INHALATION) EVERY 4 HOURS PRN
Qty: 18 G | Refills: 11 | Status: SHIPPED | OUTPATIENT
Start: 2024-01-23

## 2024-01-23 NOTE — PROGRESS NOTES
"Chief Complaint   Patient presents with    3 mo f/u     HTN       Subjective      Ely Acevedo is a 28 y.o. who presents for hypertension, asthma, anxiety, class III obesity.    Hypertension.  Home monitoring reveals systolics in the 120s and low 130s with diastolics in the 70s and low 80s.  She takes her medicine daily.  She may possibly be able to reduce sodium further in the diet.    Asthma.  Patient uses her rescue inhaler daily and albuterol neb at night.  Controller medications were very expensive and patient prefers to use these only during change of seasons when asthma symptoms increase.    Anxiety.  Doing well with sertraline 50 mg.  Patient needs a refill today.    Class III obesity.  Patient request referral to local bariatrics.    Sore throat.  Patient notes some decreased hearing out of the right ear with mild sensation of sore throat.  She has multiple sick contacts    The following portions of the patient's history were reviewed and updated as appropriate: allergies, current medications, past family history, past medical history, past social history, past surgical history, and problem list.    Review of Systems    Objective   Vital Signs:  /88   Pulse 94   Temp 97.1 °F (36.2 °C)   Resp 18   Ht 160 cm (63\")   Wt 122 kg (269 lb)   SpO2 97%   BMI 47.65 kg/m²               Physical Exam  Constitutional:       Appearance: Normal appearance.   HENT:      Head: Normocephalic and atraumatic.      Right Ear: Tympanic membrane and ear canal normal. A middle ear effusion is present. Tympanic membrane is retracted.      Left Ear: Tympanic membrane and ear canal normal.      Nose: Nose normal.      Mouth/Throat:      Mouth: Mucous membranes are moist.      Pharynx: Oropharynx is clear.   Eyes:      Conjunctiva/sclera: Conjunctivae normal.   Cardiovascular:      Rate and Rhythm: Normal rate and regular rhythm.      Heart sounds: Normal heart sounds. No murmur heard.  Pulmonary:      Effort: " Pulmonary effort is normal. No respiratory distress.      Breath sounds: Normal breath sounds.   Musculoskeletal:      Cervical back: Normal range of motion and neck supple. No tenderness.   Lymphadenopathy:      Cervical: No cervical adenopathy.   Skin:     General: Skin is warm and dry.   Neurological:      Mental Status: She is alert.   Psychiatric:         Mood and Affect: Mood normal.          Result Review                     Assessment and Plan  Diagnoses and all orders for this visit:    1. Primary hypertension (Primary)  Assessment & Plan:  Hypertension is improving with treatment.  Continue current treatment regimen.  Dietary sodium restriction.  Weight loss.  Regular aerobic exercise.  Continue current medications.  Increase potassium in the diet  Blood pressure will be reassessed at the next regular appointment.    Orders:  -     valsartan-hydrochlorothiazide (DIOVAN-HCT) 320-25 MG per tablet; Take 1 tablet by mouth Daily.  Dispense: 30 tablet; Refill: 5  -     Comprehensive Metabolic Panel; Future  -     Lipid Panel; Future    2. Moderate persistent asthma without complication  Comments:  Refilled albuterol neb solution  Assessment & Plan:  Asthma is unchanged.  The patient is experiencing no daytime asthma symptoms. She is experiencing no nighttime asthma symptoms.  Medications: no change.        Orders:  -     albuterol sulfate  (90 Base) MCG/ACT inhaler; Inhale 2 puffs Every 4 (Four) Hours As Needed for Wheezing.  Dispense: 18 g; Refill: 11  -     albuterol (ACCUNEB) 1.25 MG/3ML nebulizer solution; Take 3 mL by nebulization Every 6 (Six) Hours As Needed for Wheezing.  Dispense: 90 mL; Refill: 12    3. Anxiety  Comments:  Increase sertraline to 50 mg  Assessment & Plan:  Improved with treatment.  Continue sertraline 50 mg nightly    Orders:  -     sertraline (Zoloft) 50 MG tablet; Take 1 tablet by mouth Daily.  Dispense: 90 tablet; Refill: 1    4. Vitamin D deficiency  -     Vitamin  D,25-Hydroxy; Future    5. Class 3 severe obesity due to excess calories with serious comorbidity and body mass index (BMI) of 45.0 to 49.9 in adult  Assessment & Plan:  Unchanged.  Patient will be referred to local bariatric surgeon    Orders:  -     Ambulatory Referral to Bariatric Surgery    6. Acute URI  Comments:  Recommended OTC Sudafed or Flonase if eustachian tube dysfunction persists            Follow Up  Return in about 6 months (around 7/23/2024) for Next scheduled follow up HTN.  Patient was given instructions and counseling regarding her condition or for health maintenance advice. Please see specific information pulled into the AVS if appropriate.

## 2024-01-23 NOTE — ASSESSMENT & PLAN NOTE
Asthma is unchanged.  The patient is experiencing no daytime asthma symptoms. She is experiencing no nighttime asthma symptoms.  Medications: no change.

## 2024-01-23 NOTE — ASSESSMENT & PLAN NOTE
Hypertension is improving with treatment.  Continue current treatment regimen.  Dietary sodium restriction.  Weight loss.  Regular aerobic exercise.  Continue current medications.  Increase potassium in the diet  Blood pressure will be reassessed at the next regular appointment.

## 2024-06-04 ENCOUNTER — OFFICE VISIT (OUTPATIENT)
Dept: FAMILY MEDICINE CLINIC | Facility: CLINIC | Age: 29
End: 2024-06-04
Payer: COMMERCIAL

## 2024-06-04 VITALS
TEMPERATURE: 98 F | DIASTOLIC BLOOD PRESSURE: 82 MMHG | WEIGHT: 266.8 LBS | HEART RATE: 99 BPM | SYSTOLIC BLOOD PRESSURE: 124 MMHG | RESPIRATION RATE: 14 BRPM | HEIGHT: 63 IN | OXYGEN SATURATION: 98 % | BODY MASS INDEX: 47.27 KG/M2

## 2024-06-04 DIAGNOSIS — R05.1 ACUTE COUGH: ICD-10-CM

## 2024-06-04 DIAGNOSIS — R52 GENERALIZED BODY ACHES: Primary | ICD-10-CM

## 2024-06-04 DIAGNOSIS — J01.00 ACUTE NON-RECURRENT MAXILLARY SINUSITIS: ICD-10-CM

## 2024-06-04 PROBLEM — N91.4 SECONDARY OLIGOMENORRHEA: Status: ACTIVE | Noted: 2022-01-27

## 2024-06-04 PROBLEM — R32 URINARY INCONTINENCE: Status: ACTIVE | Noted: 2022-10-10

## 2024-06-04 PROBLEM — F33.1 MAJOR DEPRESSIVE DISORDER, RECURRENT, MODERATE: Status: ACTIVE | Noted: 2022-04-19

## 2024-06-04 PROBLEM — R35.0 INCREASED FREQUENCY OF URINATION: Status: ACTIVE | Noted: 2022-01-27

## 2024-06-04 PROBLEM — E28.2 PCOS (POLYCYSTIC OVARIAN SYNDROME): Status: ACTIVE | Noted: 2022-04-19

## 2024-06-04 LAB
EXPIRATION DATE: NORMAL
FLUAV AG UPPER RESP QL IA.RAPID: NOT DETECTED
FLUBV AG UPPER RESP QL IA.RAPID: NOT DETECTED
INTERNAL CONTROL: NORMAL
Lab: NORMAL
SARS-COV-2 AG UPPER RESP QL IA.RAPID: NOT DETECTED

## 2024-06-04 PROCEDURE — 99214 OFFICE O/P EST MOD 30 MIN: CPT | Performed by: NURSE PRACTITIONER

## 2024-06-04 PROCEDURE — 87428 SARSCOV & INF VIR A&B AG IA: CPT | Performed by: NURSE PRACTITIONER

## 2024-06-04 RX ORDER — ONDANSETRON 8 MG/1
TABLET, ORALLY DISINTEGRATING ORAL
COMMUNITY
Start: 2024-05-09

## 2024-06-04 RX ORDER — CHLORCYCLIZINE HYDROCHLORIDE AND PSEUDOEPHEDRINE HYDROCHLORIDE 25; 60 MG/1; MG/1
1 TABLET ORAL 3 TIMES DAILY
Qty: 21 TABLET | Refills: 0 | Status: SHIPPED | OUTPATIENT
Start: 2024-06-04

## 2024-06-04 RX ORDER — AZITHROMYCIN 250 MG/1
TABLET, FILM COATED ORAL
Qty: 6 TABLET | Refills: 0 | Status: SHIPPED | OUTPATIENT
Start: 2024-06-04

## 2024-06-04 RX ORDER — DEXTROMETHORPHAN HYDROBROMIDE AND PROMETHAZINE HYDROCHLORIDE 15; 6.25 MG/5ML; MG/5ML
5 SYRUP ORAL 4 TIMES DAILY PRN
Qty: 100 ML | Refills: 0 | Status: SHIPPED | OUTPATIENT
Start: 2024-06-04 | End: 2024-06-09

## 2024-06-04 NOTE — PROGRESS NOTES
Date: 2024   Patient Name: Ely Acevedo  : 1995   MRN: [id number]     Chief Complaint:    Chief Complaint   Patient presents with    Illness     Cough, congestion, runny nose & body aches for a couple of days       History of Present Illness: Ely Acevedo is a 28 y.o. female who is here today for Cough, congestion, shortness of breath, rhinorrhea, sore throat, body aches that started on 24  No fever, chills, chest pains, N/V/D  Taking cough syrup and cough drops without relief of symptoms  No smoking  Friend with similar symptoms.     Illness  Associated symptoms include congestion, coughing, myalgias and a sore throat.            Review of Systems:   Review of Systems   HENT:  Positive for congestion, rhinorrhea and sore throat.    Respiratory:  Positive for cough and shortness of breath.    Musculoskeletal:  Positive for myalgias.       Past Medical History:   Past Medical History:   Diagnosis Date    Anxiety     Asthma     Depression     TMJ disease        Past Surgical History:   Past Surgical History:   Procedure Laterality Date    WISDOM TOOTH EXTRACTION         Family History:   Family History   Problem Relation Age of Onset    Depression Mother     Fibromyalgia Mother     Cancer Father     Diabetes Maternal Grandfather     Cancer Maternal Grandfather     Heart disease Paternal Grandmother     Cancer Paternal Grandfather        Social History:   Social History     Socioeconomic History    Marital status:    Tobacco Use    Smoking status: Never    Smokeless tobacco: Never   Vaping Use    Vaping status: Never Used   Substance and Sexual Activity    Alcohol use: Yes    Sexual activity: Yes     Partners: Male     Birth control/protection: Condom       Medications:     Current Outpatient Medications:     albuterol (ACCUNEB) 1.25 MG/3ML nebulizer solution, Take 3 mL by nebulization Every 6 (Six) Hours As Needed for Wheezing., Disp: 90 mL, Rfl: 12    albuterol  "sulfate  (90 Base) MCG/ACT inhaler, Inhale 2 puffs Every 4 (Four) Hours As Needed for Wheezing., Disp: 18 g, Rfl: 11    metFORMIN ER (GLUCOPHAGE-XR) 500 MG 24 hr tablet, Take 1 tablet by mouth Daily With Breakfast., Disp: , Rfl:     omeprazole (priLOSEC) 20 MG capsule, Take 1 capsule by mouth Daily., Disp: 90 capsule, Rfl: 3    ondansetron ODT (ZOFRAN-ODT) 8 MG disintegrating tablet, LET 1 TABLET DISSOLVE ON TOP OF THE TONGUE TWICE A DAY AS NEEDED, Disp: , Rfl:     Progesterone (PROMETRIUM) 200 MG capsule, TAKE 1 CAPSULE (200 MG TOTAL) BY MOUTH EVERY NIGHT FOR 10 DAYS., Disp: , Rfl:     sertraline (Zoloft) 50 MG tablet, Take 1 tablet by mouth Daily., Disp: 90 tablet, Rfl: 1    Tirzepatide-Weight Management (ZEPBOUND) 2.5 MG/0.5ML solution auto-injector, Inject 0.5 mL under the skin into the appropriate area as directed 1 (One) Time Per Week., Disp: , Rfl:     valsartan-hydrochlorothiazide (DIOVAN-HCT) 320-25 MG per tablet, Take 1 tablet by mouth Daily., Disp: 30 tablet, Rfl: 5    vitamin D (ERGOCALCIFEROL) 1.25 MG (88580 UT) capsule capsule, Take 1 capsule by mouth 1 (One) Time Per Week., Disp: 6 capsule, Rfl: 0    azithromycin (Zithromax Z-Ricardo) 250 MG tablet, Take 2 tablets by mouth on day 1, then 1 tablet daily on days 2-5, Disp: 6 tablet, Rfl: 0    Chlorcyclizine-Pseudoephed (Stahist AD) 25-60 MG tablet, Take 1 tablet by mouth 3 times a day., Disp: 21 tablet, Rfl: 0    promethazine-dextromethorphan (PROMETHAZINE-DM) 6.25-15 MG/5ML syrup, Take 5 mL by mouth 4 (Four) Times a Day As Needed for Cough for up to 5 days., Disp: 100 mL, Rfl: 0    Allergies:   Allergies   Allergen Reactions    Lisinopril Cough    Penicillins Rash         Physical Exam:  Vital Signs:   Vitals:    06/04/24 1123   BP: 124/82   Pulse: 99   Resp: 14   Temp: 98 °F (36.7 °C)   SpO2: 98%   Weight: 121 kg (266 lb 12.8 oz)   Height: 160 cm (63\")     Body mass index is 47.26 kg/m².     Physical Exam  Vitals and nursing note reviewed. "   Constitutional:       Appearance: She is not ill-appearing.   HENT:      Head: Normocephalic and atraumatic.      Right Ear: External ear normal. No middle ear effusion. Tympanic membrane is not erythematous or bulging.      Left Ear: External ear normal.  No middle ear effusion. Tympanic membrane is not erythematous or bulging.      Nose: Nasal tenderness and congestion present.      Right Turbinates: Swollen. Not enlarged.      Left Turbinates: Not enlarged or swollen.      Right Sinus: Maxillary sinus tenderness present.      Left Sinus: No maxillary sinus tenderness.      Mouth/Throat:      Mouth: Mucous membranes are moist.      Pharynx: Posterior oropharyngeal erythema present. No pharyngeal swelling.      Tonsils: No tonsillar exudate.   Eyes:      Pupils: Pupils are equal, round, and reactive to light.   Cardiovascular:      Rate and Rhythm: Normal rate and regular rhythm.   Pulmonary:      Effort: Pulmonary effort is normal.      Breath sounds: Normal breath sounds.   Abdominal:      General: Bowel sounds are normal.   Musculoskeletal:      Cervical back: Normal range of motion and neck supple.   Skin:     General: Skin is warm.   Neurological:      General: No focal deficit present.      Mental Status: She is alert and oriented to person, place, and time.   Psychiatric:         Mood and Affect: Mood normal.           Assessment/Plan:   Diagnoses and all orders for this visit:    1. Generalized body aches (Primary)  -     POCT SARS-CoV-2 Antigen MAYE + Flu    2. Acute non-recurrent maxillary sinusitis  -     azithromycin (Zithromax Z-Ricardo) 250 MG tablet; Take 2 tablets by mouth on day 1, then 1 tablet daily on days 2-5  Dispense: 6 tablet; Refill: 0  -     Chlorcyclizine-Pseudoephed (Stahist AD) 25-60 MG tablet; Take 1 tablet by mouth 3 times a day.  Dispense: 21 tablet; Refill: 0    3. Acute cough  -     promethazine-dextromethorphan (PROMETHAZINE-DM) 6.25-15 MG/5ML syrup; Take 5 mL by mouth 4 (Four) Times  a Day As Needed for Cough for up to 5 days.  Dispense: 100 mL; Refill: 0       Negative for covid and flu  Increase fluid intake  Take cough medicine as prescribed avoid driving when taking medication.  Take zpack and stahist AD as prescribed within plan  Monitor for worsening symptoms  Tylenol and ibuprofen as needed for aches and fever.  Go to the ER for any shortness of breath, chest pains, fever uncontrolled by medications.      Follow Up:   Return if symptoms worsen or fail to improve.    Sobia Calle. MARY   [facility]

## 2024-06-24 DIAGNOSIS — J45.40 MODERATE PERSISTENT ASTHMA WITHOUT COMPLICATION: ICD-10-CM

## 2024-06-26 DIAGNOSIS — J45.40 MODERATE PERSISTENT ASTHMA WITHOUT COMPLICATION: ICD-10-CM

## 2024-06-26 RX ORDER — ALBUTEROL SULFATE 90 UG/1
2 AEROSOL, METERED RESPIRATORY (INHALATION) EVERY 4 HOURS PRN
Qty: 18 G | Refills: 11 | Status: SHIPPED | OUTPATIENT
Start: 2024-06-26

## 2024-06-26 RX ORDER — ALBUTEROL SULFATE 90 UG/1
2 AEROSOL, METERED RESPIRATORY (INHALATION) EVERY 4 HOURS PRN
Qty: 18 G | Refills: 11 | Status: SHIPPED | OUTPATIENT
Start: 2024-06-26 | End: 2024-06-26 | Stop reason: SDUPTHER

## 2024-07-18 DIAGNOSIS — F41.9 ANXIETY: ICD-10-CM

## 2024-07-24 DIAGNOSIS — I10 PRIMARY HYPERTENSION: ICD-10-CM

## 2024-07-24 RX ORDER — VALSARTAN AND HYDROCHLOROTHIAZIDE 320; 25 MG/1; MG/1
1 TABLET, FILM COATED ORAL DAILY
Qty: 90 TABLET | Refills: 0 | Status: SHIPPED | OUTPATIENT
Start: 2024-07-24

## 2024-09-06 DIAGNOSIS — K21.9 GASTROESOPHAGEAL REFLUX DISEASE WITHOUT ESOPHAGITIS: ICD-10-CM

## 2024-11-02 DIAGNOSIS — I10 PRIMARY HYPERTENSION: ICD-10-CM

## 2024-11-04 RX ORDER — VALSARTAN AND HYDROCHLOROTHIAZIDE 320; 25 MG/1; MG/1
1 TABLET, FILM COATED ORAL DAILY
Qty: 90 TABLET | Refills: 0 | Status: SHIPPED | OUTPATIENT
Start: 2024-11-04

## 2025-01-08 DIAGNOSIS — F41.9 ANXIETY: ICD-10-CM

## 2025-01-10 ENCOUNTER — OFFICE VISIT (OUTPATIENT)
Dept: FAMILY MEDICINE CLINIC | Facility: CLINIC | Age: 30
End: 2025-01-10
Payer: COMMERCIAL

## 2025-01-10 VITALS
BODY MASS INDEX: 46.85 KG/M2 | HEIGHT: 63 IN | SYSTOLIC BLOOD PRESSURE: 128 MMHG | WEIGHT: 264.4 LBS | RESPIRATION RATE: 20 BRPM | HEART RATE: 85 BPM | DIASTOLIC BLOOD PRESSURE: 90 MMHG | TEMPERATURE: 97.1 F | OXYGEN SATURATION: 99 %

## 2025-01-10 DIAGNOSIS — J06.9 ACUTE URI: Primary | ICD-10-CM

## 2025-01-10 DIAGNOSIS — L73.2 HIDRADENITIS SUPPURATIVA: ICD-10-CM

## 2025-01-10 PROCEDURE — 99213 OFFICE O/P EST LOW 20 MIN: CPT | Performed by: FAMILY MEDICINE

## 2025-01-10 NOTE — PROGRESS NOTES
"Chief Complaint   Patient presents with    check lesion on mid back and R inner thigh      Pt describes as cysts that are coming and going     cough, SOA, sinus congestion, runny nose      For the past week        Subjective      Ely Acevedo is a 29 y.o. who presents for skin lesions.  Patient has seen local dermatology over right axillary skin lesion but would like to see an alternative dermatologist for second opinion.  She also has a lesion in the right inguinal area that will swell, rupture with bloody fluid expressed and then recur.  Significant swelling will occur about once per month.    Patient is also been treated for a URI and to a course of doxycycline.  She has a persistent lingering cough    Objective   Vital Signs:  /90   Pulse 85   Temp 97.1 °F (36.2 °C)   Resp 20   Ht 160 cm (63\")   Wt 120 kg (264 lb 6.4 oz)   SpO2 99%   BMI 46.84 kg/m²     Physical Exam  Vitals reviewed.   Constitutional:       Appearance: Normal appearance.   Pulmonary:      Effort: Pulmonary effort is normal.      Breath sounds: Normal breath sounds.   Skin:     Findings: Lesion present.   Neurological:      Mental Status: She is alert.          Result Review                     Assessment and Plan  Diagnoses and all orders for this visit:    1. Acute URI (Primary)    2. Hidradenitis suppurativa  -     Ambulatory Referral to Dermatology            Follow Up  No follow-ups on file.  Patient was given instructions and counseling regarding her condition or for health maintenance advice. Please see specific information pulled into the AVS if appropriate.       "

## 2025-01-17 ENCOUNTER — TELEPHONE (OUTPATIENT)
Dept: FAMILY MEDICINE CLINIC | Facility: CLINIC | Age: 30
End: 2025-01-17
Payer: COMMERCIAL

## 2025-01-17 ENCOUNTER — OFFICE VISIT (OUTPATIENT)
Dept: FAMILY MEDICINE CLINIC | Facility: CLINIC | Age: 30
End: 2025-01-17
Payer: COMMERCIAL

## 2025-01-17 VITALS
WEIGHT: 263 LBS | BODY MASS INDEX: 46.6 KG/M2 | HEIGHT: 63 IN | RESPIRATION RATE: 16 BRPM | SYSTOLIC BLOOD PRESSURE: 124 MMHG | HEART RATE: 76 BPM | TEMPERATURE: 97.7 F | OXYGEN SATURATION: 98 % | DIASTOLIC BLOOD PRESSURE: 88 MMHG

## 2025-01-17 DIAGNOSIS — J45.40 MODERATE PERSISTENT ASTHMA WITHOUT COMPLICATION: ICD-10-CM

## 2025-01-17 DIAGNOSIS — J01.00 ACUTE NON-RECURRENT MAXILLARY SINUSITIS: ICD-10-CM

## 2025-01-17 DIAGNOSIS — J20.9 ACUTE BRONCHITIS, UNSPECIFIED ORGANISM: Primary | ICD-10-CM

## 2025-01-17 RX ORDER — DOXYCYCLINE 100 MG/1
100 CAPSULE ORAL 2 TIMES DAILY
Qty: 10 CAPSULE | Refills: 0 | Status: CANCELLED | OUTPATIENT
Start: 2025-01-17 | End: 2025-01-22

## 2025-01-17 RX ORDER — METHYLPREDNISOLONE 4 MG/1
TABLET ORAL
Qty: 21 TABLET | Refills: 0 | Status: SHIPPED | OUTPATIENT
Start: 2025-01-17

## 2025-01-17 RX ORDER — AZITHROMYCIN 250 MG/1
TABLET, FILM COATED ORAL
Qty: 6 TABLET | Refills: 0 | Status: SHIPPED | OUTPATIENT
Start: 2025-01-17

## 2025-01-17 RX ORDER — BROMPHENIRAMINE MALEATE, PSEUDOEPHEDRINE HYDROCHLORIDE, AND DEXTROMETHORPHAN HYDROBROMIDE 2; 30; 10 MG/5ML; MG/5ML; MG/5ML
SYRUP ORAL
Qty: 180 ML | Refills: 0 | Status: SHIPPED | OUTPATIENT
Start: 2025-01-17

## 2025-01-17 NOTE — TELEPHONE ENCOUNTER
Patient will need an appointment to make the determination about which medications would work best for her

## 2025-01-17 NOTE — ASSESSMENT & PLAN NOTE
Begin antibiotic and steroid.  Take Bromfed as needed for cough.  Ensure adequate hydration.  If symptoms worsen over the weekend or patient develops a fever, she should go to the emergency room.  If symptoms have failed to improve by Monday, patient should call the office for an order for chest x-ray.

## 2025-01-17 NOTE — TELEPHONE ENCOUNTER
"    Caller: Ely Acevedo \"Maxine\"    Relationship: Self    Best call back number: [phone]    What medication are you requesting: STEROID OR ANTIBIOTIC OR COUGH MEDICATION     What are your current symptoms: WHEEZING COUGH CONGESTION SHORTNESS OF BREATH DUE TO COUGH     How long have you been experiencing symptoms: ABOUT TWO WEEKS     Have you had these symptoms before:    [x] Yes  [] No    Have you been treated for these symptoms before:   [x] Yes  [] No    If a prescription is needed, what is your preferred pharmacy and phone number:  [facility]     Additional notes: THE PATIENT WAS SEEN ON 01/10/2025 AND AT THE TIME THE DOCTOR DID NOT WANT TO PRESCRIBE ANTIBIOTICS BECAUSE SHE HAD RECENTLY BEEN ON ANTIBIOTICS THE PATIENT STATES THAT HER SYMPTOMS ARE GETTING WORSE SHE IS USING HER INHALERS MORE THAN NORMAL THE PATIENT WOULD LIKE TO KNOW IF A MEDICATION CAN BE CALLED IN FOR  HER           "

## 2025-01-17 NOTE — PROGRESS NOTES
Office Note     Name: Ely Acevedo    : 1995     MRN: [id number]     Chief Complaint  URI (Head and congestion since , last 2 weeks getting worse. Bad cough. Talking makes it worse. SOA/Need for new Nebulizer, hers broke.)    Subjective     History of Present Illness:  Ely Acevedo is a 29 y.o. female who presents today with complaints of cough, shortness of breath, aches, runny nose, congestion, sinus pressure, and sinus tenderness.  Patient got sick before , went to urgent care and was given a steroid and doxycycline.  Felt better for about a week, but then it all came back.  Symptoms have not seemed to improve since then.    Past Medical History:   Past Medical History:   Diagnosis Date    Anxiety     Asthma     Depression     TMJ disease        Past Surgical History:   Past Surgical History:   Procedure Laterality Date    WISDOM TOOTH EXTRACTION         Immunizations:   Immunization History   Administered Date(s) Administered    COVID-19 (MODERNA) 1st,2nd,3rd Dose Monovalent 2020, 2021    COVID-19 (MODERNA) Monovalent Original Booster 2022    COVID-19 (UNSPECIFIED) 10/22/2024    Flublok 18+yrs 2022    Fluzone (or Fluarix & Flulaval for VFC) >6mos 10/05/2023    Hepatitis A 11/10/2018    Influenza, Unspecified 11/10/2018, 2022, 10/22/2024    Tdap 2019    flucelvax quad pfs =>4 YRS 11/10/2018, 10/07/2019        Medications:     Current Outpatient Medications:     albuterol (ACCUNEB) 1.25 MG/3ML nebulizer solution, Take 3 mL by nebulization Every 6 (Six) Hours As Needed for Wheezing., Disp: 90 mL, Rfl: 12    albuterol sulfate  (90 Base) MCG/ACT inhaler, Inhale 2 puffs Every 4 (Four) Hours As Needed for Wheezing., Disp: 18 g, Rfl: 11    metFORMIN ER (GLUCOPHAGE-XR) 500 MG 24 hr tablet, Take 1 tablet by mouth Daily With Breakfast., Disp: , Rfl:     omeprazole (priLOSEC) 20 MG capsule, TAKE 1 CAPSULE BY MOUTH EVERY DAY, Disp: 90  "capsule, Rfl: 3    ondansetron ODT (ZOFRAN-ODT) 8 MG disintegrating tablet, LET 1 TABLET DISSOLVE ON TOP OF THE TONGUE TWICE A DAY AS NEEDED, Disp: , Rfl:     Progesterone (PROMETRIUM) 200 MG capsule, TAKE 1 CAPSULE (200 MG TOTAL) BY MOUTH EVERY NIGHT FOR 10 DAYS., Disp: , Rfl:     sertraline (ZOLOFT) 50 MG tablet, TAKE 1 TABLET BY MOUTH EVERY DAY, Disp: 90 tablet, Rfl: 0    valsartan-hydrochlorothiazide (DIOVAN-HCT) 320-25 MG per tablet, Take 1 tablet by mouth Daily., Disp: 90 tablet, Rfl: 0    vitamin D (ERGOCALCIFEROL) 1.25 MG (14937 UT) capsule capsule, Take 1 capsule by mouth 1 (One) Time Per Week., Disp: 6 capsule, Rfl: 0    azithromycin (Zithromax Z-Ricardo) 250 MG tablet, Take 2 tablets by mouth on day 1, then 1 tablet daily on days 2-5, Disp: 6 tablet, Rfl: 0    brompheniramine-pseudoephedrine-DM 30-2-10 MG/5ML syrup, 5-10 mL PO q 6-8 hours PRN, Disp: 180 mL, Rfl: 0    methylPREDNISolone (MEDROL) 4 MG dose pack, Take as directed on package instructions., Disp: 21 tablet, Rfl: 0    Allergies:   Allergies   Allergen Reactions    Lisinopril Cough    Penicillins Rash       Family History:   Family History   Problem Relation Age of Onset    Depression Mother     Fibromyalgia Mother     Cancer Father     Diabetes Maternal Grandfather     Cancer Maternal Grandfather     Heart disease Paternal Grandmother     Cancer Paternal Grandfather        Social History:   Social History     Socioeconomic History    Marital status:    Tobacco Use    Smoking status: Never    Smokeless tobacco: Never   Vaping Use    Vaping status: Never Used   Substance and Sexual Activity    Alcohol use: Yes    Sexual activity: Yes     Partners: Male     Birth control/protection: Condom       Objective     Vital Signs  /88   Pulse 76   Temp 97.7 °F (36.5 °C)   Resp 16   Ht 160 cm (63\")   Wt 119 kg (263 lb)   SpO2 98%   BMI 46.59 kg/m²   Estimated body mass index is 46.59 kg/m² as calculated from the following:    Height as of " "this encounter: 160 cm (63\").    Weight as of this encounter: 119 kg (263 lb).        Physical Exam  Vitals and nursing note reviewed.   Constitutional:       Appearance: Normal appearance.   HENT:      Head: Normocephalic and atraumatic.      Nose:      Right Sinus: Maxillary sinus tenderness present. No frontal sinus tenderness.      Left Sinus: Maxillary sinus tenderness present. No frontal sinus tenderness.   Cardiovascular:      Rate and Rhythm: Normal rate and regular rhythm.      Heart sounds: No murmur heard.     No friction rub. No gallop.   Pulmonary:      Effort: Pulmonary effort is normal.      Breath sounds: Normal breath sounds. No wheezing, rhonchi or rales.   Lymphadenopathy:      Head:      Right side of head: No submental, submandibular or tonsillar adenopathy.      Left side of head: No submental, submandibular or tonsillar adenopathy.   Skin:     General: Skin is warm and dry.   Neurological:      General: No focal deficit present.      Mental Status: She is alert and oriented to person, place, and time.   Psychiatric:         Mood and Affect: Mood normal.         Behavior: Behavior normal.        Assessment and Plan     Diagnoses and all orders for this visit:    1. Acute bronchitis, unspecified organism (Primary)  Assessment & Plan:  Begin antibiotic and steroid.  Take Bromfed as needed for cough.  Ensure adequate hydration.  If symptoms worsen over the weekend or patient develops a fever, she should go to the emergency room.  If symptoms have failed to improve by Monday, patient should call the office for an order for chest x-ray.    Orders:  -     methylPREDNISolone (MEDROL) 4 MG dose pack; Take as directed on package instructions.  Dispense: 21 tablet; Refill: 0  -     brompheniramine-pseudoephedrine-DM 30-2-10 MG/5ML syrup; 5-10 mL PO q 6-8 hours PRN  Dispense: 180 mL; Refill: 0  -     azithromycin (Zithromax Z-Ricardo) 250 MG tablet; Take 2 tablets by mouth on day 1, then 1 tablet daily on " days 2-5  Dispense: 6 tablet; Refill: 0    2. Acute non-recurrent maxillary sinusitis  -     methylPREDNISolone (MEDROL) 4 MG dose pack; Take as directed on package instructions.  Dispense: 21 tablet; Refill: 0  -     brompheniramine-pseudoephedrine-DM 30-2-10 MG/5ML syrup; 5-10 mL PO q 6-8 hours PRN  Dispense: 180 mL; Refill: 0  -     azithromycin (Zithromax Z-Ricardo) 250 MG tablet; Take 2 tablets by mouth on day 1, then 1 tablet daily on days 2-5  Dispense: 6 tablet; Refill: 0    3. Moderate persistent asthma without complication  -     Home Nebulizer         Follow Up  No follow-ups on file.    THOMAS Whelan  [facility]  [address] [facility] [address] [phone]

## 2025-02-03 DIAGNOSIS — I10 PRIMARY HYPERTENSION: ICD-10-CM

## 2025-02-03 RX ORDER — VALSARTAN AND HYDROCHLOROTHIAZIDE 320; 25 MG/1; MG/1
1 TABLET, FILM COATED ORAL DAILY
Qty: 90 TABLET | Refills: 0 | Status: SHIPPED | OUTPATIENT
Start: 2025-02-03

## 2025-02-15 ENCOUNTER — PATIENT MESSAGE (OUTPATIENT)
Dept: FAMILY MEDICINE CLINIC | Facility: CLINIC | Age: 30
End: 2025-02-15
Payer: COMMERCIAL

## 2025-02-18 DIAGNOSIS — J20.9 ACUTE BRONCHITIS, UNSPECIFIED ORGANISM: Primary | ICD-10-CM

## 2025-03-19 DIAGNOSIS — E55.9 VITAMIN D DEFICIENCY: ICD-10-CM

## 2025-03-19 RX ORDER — ERGOCALCIFEROL 1.25 MG/1
50000 CAPSULE, LIQUID FILLED ORAL
Qty: 4 CAPSULE | Refills: 1 | OUTPATIENT
Start: 2025-03-19

## 2025-03-19 RX ORDER — ERGOCALCIFEROL 1.25 MG/1
50000 CAPSULE, LIQUID FILLED ORAL WEEKLY
Qty: 6 CAPSULE | Refills: 0 | OUTPATIENT
Start: 2025-03-19

## 2025-04-08 DIAGNOSIS — F41.9 ANXIETY: ICD-10-CM

## 2025-07-20 DIAGNOSIS — I10 PRIMARY HYPERTENSION: ICD-10-CM

## 2025-07-20 DIAGNOSIS — F41.9 ANXIETY: ICD-10-CM

## 2025-07-24 ENCOUNTER — OFFICE VISIT (OUTPATIENT)
Dept: FAMILY MEDICINE CLINIC | Facility: CLINIC | Age: 30
End: 2025-07-24
Payer: COMMERCIAL

## 2025-07-24 VITALS
HEIGHT: 63 IN | BODY MASS INDEX: 44.83 KG/M2 | TEMPERATURE: 97.3 F | OXYGEN SATURATION: 96 % | RESPIRATION RATE: 16 BRPM | WEIGHT: 253 LBS | DIASTOLIC BLOOD PRESSURE: 84 MMHG | HEART RATE: 90 BPM | SYSTOLIC BLOOD PRESSURE: 122 MMHG

## 2025-07-24 DIAGNOSIS — E55.9 VITAMIN D DEFICIENCY: ICD-10-CM

## 2025-07-24 DIAGNOSIS — R41.840 INATTENTION: ICD-10-CM

## 2025-07-24 DIAGNOSIS — F41.9 ANXIETY: Primary | ICD-10-CM

## 2025-07-24 DIAGNOSIS — J45.40 MODERATE PERSISTENT ASTHMA WITHOUT COMPLICATION: ICD-10-CM

## 2025-07-24 DIAGNOSIS — K21.9 GASTROESOPHAGEAL REFLUX DISEASE WITHOUT ESOPHAGITIS: ICD-10-CM

## 2025-07-24 DIAGNOSIS — I10 PRIMARY HYPERTENSION: ICD-10-CM

## 2025-07-24 PROCEDURE — 99213 OFFICE O/P EST LOW 20 MIN: CPT

## 2025-07-24 RX ORDER — VALSARTAN AND HYDROCHLOROTHIAZIDE 320; 25 MG/1; MG/1
1 TABLET, FILM COATED ORAL DAILY
Qty: 90 TABLET | Refills: 0 | OUTPATIENT
Start: 2025-07-24

## 2025-07-24 RX ORDER — ERGOCALCIFEROL 1.25 MG/1
50000 CAPSULE, LIQUID FILLED ORAL WEEKLY
Qty: 6 CAPSULE | Refills: 0 | Status: SHIPPED | OUTPATIENT
Start: 2025-07-24

## 2025-07-24 RX ORDER — OMEPRAZOLE 20 MG/1
20 CAPSULE, DELAYED RELEASE ORAL DAILY
Qty: 90 CAPSULE | Refills: 3 | Status: SHIPPED | OUTPATIENT
Start: 2025-07-24

## 2025-07-24 RX ORDER — BUPROPION HYDROCHLORIDE 150 MG/1
150 TABLET ORAL DAILY
Qty: 90 TABLET | Refills: 1 | Status: SHIPPED | OUTPATIENT
Start: 2025-07-24

## 2025-07-24 RX ORDER — ALBUTEROL SULFATE 90 UG/1
2 INHALANT RESPIRATORY (INHALATION) EVERY 4 HOURS PRN
Qty: 18 G | Refills: 11 | Status: SHIPPED | OUTPATIENT
Start: 2025-07-24

## 2025-07-24 RX ORDER — VALSARTAN AND HYDROCHLOROTHIAZIDE 320; 25 MG/1; MG/1
1 TABLET, FILM COATED ORAL DAILY
Qty: 90 TABLET | Refills: 1 | Status: SHIPPED | OUTPATIENT
Start: 2025-07-24

## 2025-07-24 NOTE — ASSESSMENT & PLAN NOTE
Add on Wellbutrin to Zoloft to see if symptoms of inattention improve  Referral to Behavioral health for ADHD workup

## 2025-07-24 NOTE — PROGRESS NOTES
Office Note     Name: Ely Acevedo    : 1995     MRN: [id number]     Chief Complaint  Discuss depression meds (Change to Wellbutrin? ) and Med rfs    Subjective     History of Present Illness:  Ely Acevedo is a 29 y.o. female who presents today for a anxiety and depression follow up. Admits that her anxiety and depression feel mostly well controlled with Zoloft. Denies medication adverse effects. Denies suicidal thoughts or ideations.     Is concerned about symptoms consistent with ADHD and has heard that Wellbutrin can help manage symptoms of inattention and those associated with ADHD. Would like to try Wellbutrin to see if it is beneficial. Also is interested in a formal workup for ADHD.     No other questions or concerns.     Past Medical History:   Past Medical History:   Diagnosis Date    Anxiety     Asthma     Depression     TMJ disease        Past Surgical History:   Past Surgical History:   Procedure Laterality Date    WISDOM TOOTH EXTRACTION         Immunizations:   Immunization History   Administered Date(s) Administered    COVID-19 (MODERNA) 1st,2nd,3rd Dose Monovalent 2020, 2021    COVID-19 (MODERNA) Monovalent Original Booster 2022    COVID-19 (UNSPECIFIED) 10/22/2024    Flublok 18+yrs 2022    Fluzone (or Fluarix & Flulaval for VFC) >6mos 10/05/2023    Hepatitis A 11/10/2018    Influenza, Unspecified 11/10/2018, 2022, 10/22/2024    Tdap 2019    flucelvax quad pfs =>4 YRS 11/10/2018, 10/07/2019        Medications:     Current Outpatient Medications:     albuterol (ACCUNEB) 1.25 MG/3ML nebulizer solution, Take 3 mL by nebulization Every 6 (Six) Hours As Needed for Wheezing., Disp: 90 mL, Rfl: 12    albuterol sulfate  (90 Base) MCG/ACT inhaler, Inhale 2 puffs Every 4 (Four) Hours As Needed for Wheezing., Disp: 18 g, Rfl: 11    metFORMIN ER (GLUCOPHAGE-XR) 500 MG 24 hr tablet, Take 1 tablet by mouth Daily With Breakfast., Disp: , Rfl:      "omeprazole (priLOSEC) 20 MG capsule, Take 1 capsule by mouth Daily., Disp: 90 capsule, Rfl: 3    Progesterone (PROMETRIUM) 200 MG capsule, TAKE 1 CAPSULE (200 MG TOTAL) BY MOUTH EVERY NIGHT FOR 10 DAYS., Disp: , Rfl:     sertraline (ZOLOFT) 50 MG tablet, Take 1 tablet by mouth Daily., Disp: 90 tablet, Rfl: 1    valsartan-hydrochlorothiazide (DIOVAN-HCT) 320-25 MG per tablet, Take 1 tablet by mouth Daily., Disp: 90 tablet, Rfl: 1    vitamin D (ERGOCALCIFEROL) 1.25 MG (90569 UT) capsule capsule, Take 1 capsule by mouth 1 (One) Time Per Week., Disp: 6 capsule, Rfl: 0    buPROPion XL (Wellbutrin XL) 150 MG 24 hr tablet, Take 1 tablet by mouth Daily., Disp: 90 tablet, Rfl: 1    Allergies:   Allergies   Allergen Reactions    Lisinopril Cough    Other GI Intolerance     WEGOVY (headache and nausea)     Penicillins Rash       Family History:   Family History   Problem Relation Age of Onset    Depression Mother     Fibromyalgia Mother     Cancer Father     Diabetes Maternal Grandfather     Cancer Maternal Grandfather     Heart disease Paternal Grandmother     Cancer Paternal Grandfather        Social History:   Social History     Socioeconomic History    Marital status:    Tobacco Use    Smoking status: Never    Smokeless tobacco: Never   Vaping Use    Vaping status: Never Used   Substance and Sexual Activity    Alcohol use: Yes    Sexual activity: Yes     Partners: Male     Birth control/protection: Condom       Objective     Vital Signs  /84   Pulse 90   Temp 97.3 °F (36.3 °C)   Resp 16   Ht 160 cm (63\")   Wt 115 kg (253 lb)   SpO2 96%   BMI 44.82 kg/m²   Estimated body mass index is 44.82 kg/m² as calculated from the following:    Height as of this encounter: 160 cm (63\").    Weight as of this encounter: 115 kg (253 lb).    Physical Exam  Vitals and nursing note reviewed.   Constitutional:       Appearance: Normal appearance.   HENT:      Head: Normocephalic and atraumatic.   Cardiovascular:      Rate " and Rhythm: Normal rate and regular rhythm.      Heart sounds: No murmur heard.     No friction rub. No gallop.   Pulmonary:      Effort: Pulmonary effort is normal.      Breath sounds: Normal breath sounds. No wheezing, rhonchi or rales.   Skin:     General: Skin is warm and dry.   Neurological:      General: No focal deficit present.      Mental Status: She is alert and oriented to person, place, and time.   Psychiatric:         Mood and Affect: Mood normal.         Behavior: Behavior normal.          Assessment and Plan     Diagnoses and all orders for this visit:    1. Anxiety (Primary)  Assessment & Plan:  Anxiety is stable  Continue Zoloft 50 mg  Follow up in 6 months     Orders:  -     sertraline (ZOLOFT) 50 MG tablet; Take 1 tablet by mouth Daily.  Dispense: 90 tablet; Refill: 1  -     buPROPion XL (Wellbutrin XL) 150 MG 24 hr tablet; Take 1 tablet by mouth Daily.  Dispense: 90 tablet; Refill: 1    2. Inattention  Assessment & Plan:  Add on Wellbutrin to Zoloft to see if symptoms of inattention improve  Referral to Behavioral health for ADHD workup    Orders:  -     buPROPion XL (Wellbutrin XL) 150 MG 24 hr tablet; Take 1 tablet by mouth Daily.  Dispense: 90 tablet; Refill: 1  -     Ambulatory Referral to Behavioral Health    3. Primary hypertension  Assessment & Plan:  Hypertension is stable and controlled  Continue current treatment regimen.  Dietary sodium restriction.  Weight loss.  Regular aerobic exercise.  Blood pressure will be reassessed in 6 months.    Orders:  -     valsartan-hydrochlorothiazide (DIOVAN-HCT) 320-25 MG per tablet; Take 1 tablet by mouth Daily.  Dispense: 90 tablet; Refill: 1    4. Gastroesophageal reflux disease without esophagitis  -     omeprazole (priLOSEC) 20 MG capsule; Take 1 capsule by mouth Daily.  Dispense: 90 capsule; Refill: 3    5. Moderate persistent asthma without complication  -     albuterol sulfate  (90 Base) MCG/ACT inhaler; Inhale 2 puffs Every 4 (Four)  Hours As Needed for Wheezing.  Dispense: 18 g; Refill: 11    6. Vitamin D deficiency  -     vitamin D (ERGOCALCIFEROL) 1.25 MG (37773 UT) capsule capsule; Take 1 capsule by mouth 1 (One) Time Per Week.  Dispense: 6 capsule; Refill: 0       Follow Up  Return in about 6 months (around 1/24/2026) for Annual physical.    THOMAS Whelan  [facility]  [address] [facility] [address] [phone]

## 2025-08-18 DIAGNOSIS — E55.9 VITAMIN D DEFICIENCY: ICD-10-CM

## 2025-08-19 RX ORDER — ERGOCALCIFEROL 1.25 MG/1
50000 CAPSULE, LIQUID FILLED ORAL WEEKLY
Qty: 4 CAPSULE | Refills: 1 | OUTPATIENT
Start: 2025-08-19